# Patient Record
Sex: FEMALE | Race: WHITE | Employment: UNEMPLOYED | URBAN - METROPOLITAN AREA
[De-identification: names, ages, dates, MRNs, and addresses within clinical notes are randomized per-mention and may not be internally consistent; named-entity substitution may affect disease eponyms.]

---

## 2020-12-23 ENCOUNTER — APPOINTMENT (OUTPATIENT)
Dept: CT IMAGING | Age: 55
DRG: 177 | End: 2020-12-23
Payer: COMMERCIAL

## 2020-12-23 ENCOUNTER — HOSPITAL ENCOUNTER (INPATIENT)
Age: 55
LOS: 4 days | Discharge: HOME HEALTH | DRG: 177 | End: 2020-12-27
Attending: EMERGENCY MEDICINE | Admitting: INTERNAL MEDICINE
Payer: COMMERCIAL

## 2020-12-23 ENCOUNTER — APPOINTMENT (OUTPATIENT)
Dept: GENERAL RADIOLOGY | Age: 55
DRG: 177 | End: 2020-12-23
Payer: COMMERCIAL

## 2020-12-23 PROBLEM — E66.01 MORBID OBESITY WITH BMI OF 40.0-44.9, ADULT (HCC): Status: ACTIVE | Noted: 2020-12-23

## 2020-12-23 PROBLEM — R79.82 CRP ELEVATED: Status: ACTIVE | Noted: 2020-12-23

## 2020-12-23 PROBLEM — J12.82 PNEUMONIA DUE TO COVID-19 VIRUS: Status: ACTIVE | Noted: 2020-12-23

## 2020-12-23 PROBLEM — R09.02 HYPOXIA: Status: ACTIVE | Noted: 2020-12-23

## 2020-12-23 PROBLEM — U07.1 COVID-19 VIRUS INFECTION: Status: ACTIVE | Noted: 2020-12-23

## 2020-12-23 LAB
-: NORMAL
ABSOLUTE EOS #: <0.03 K/UL (ref 0–0.44)
ABSOLUTE IMMATURE GRANULOCYTE: 0.03 K/UL (ref 0–0.3)
ABSOLUTE LYMPH #: 1.53 K/UL (ref 1.1–3.7)
ABSOLUTE MONO #: 0.42 K/UL (ref 0.1–1.2)
ALBUMIN SERPL-MCNC: 3.9 G/DL (ref 3.5–5.2)
ALBUMIN/GLOBULIN RATIO: 1.1 (ref 1–2.5)
ALP BLD-CCNC: 64 U/L (ref 35–104)
ALT SERPL-CCNC: 43 U/L (ref 5–33)
AMORPHOUS: NORMAL
ANION GAP SERPL CALCULATED.3IONS-SCNC: 15 MMOL/L (ref 9–17)
AST SERPL-CCNC: 57 U/L
BACTERIA: NORMAL
BASOPHILS # BLD: 0 % (ref 0–2)
BASOPHILS ABSOLUTE: <0.03 K/UL (ref 0–0.2)
BILIRUB SERPL-MCNC: 0.38 MG/DL (ref 0.3–1.2)
BILIRUBIN URINE: NEGATIVE
BNP INTERPRETATION: NORMAL
BUN BLDV-MCNC: 12 MG/DL (ref 6–20)
BUN/CREAT BLD: ABNORMAL (ref 9–20)
C-REACTIVE PROTEIN: 33.4 MG/L (ref 0–5)
CALCIUM SERPL-MCNC: 8.6 MG/DL (ref 8.6–10.4)
CASTS UA: NORMAL /LPF (ref 0–8)
CHLORIDE BLD-SCNC: 102 MMOL/L (ref 98–107)
CO2: 19 MMOL/L (ref 20–31)
COLOR: YELLOW
COMMENT UA: ABNORMAL
CREAT SERPL-MCNC: 0.8 MG/DL (ref 0.5–0.9)
CRYSTALS, UA: NORMAL /HPF
D-DIMER QUANTITATIVE: 0.57 MG/L FEU
DIFFERENTIAL TYPE: ABNORMAL
EKG ATRIAL RATE: 106 BPM
EKG P AXIS: 34 DEGREES
EKG P-R INTERVAL: 136 MS
EKG Q-T INTERVAL: 326 MS
EKG QRS DURATION: 98 MS
EKG QTC CALCULATION (BAZETT): 433 MS
EKG R AXIS: -34 DEGREES
EKG T AXIS: 43 DEGREES
EKG VENTRICULAR RATE: 106 BPM
EOSINOPHILS RELATIVE PERCENT: 0 % (ref 1–4)
EPITHELIAL CELLS UA: NORMAL /HPF (ref 0–5)
ESTIMATED AVERAGE GLUCOSE: 103 MG/DL
FERRITIN: 248 UG/L (ref 13–150)
FIBRINOGEN: 589 MG/DL (ref 140–420)
GFR AFRICAN AMERICAN: >60 ML/MIN
GFR NON-AFRICAN AMERICAN: >60 ML/MIN
GFR SERPL CREATININE-BSD FRML MDRD: ABNORMAL ML/MIN/{1.73_M2}
GFR SERPL CREATININE-BSD FRML MDRD: ABNORMAL ML/MIN/{1.73_M2}
GLUCOSE BLD-MCNC: 105 MG/DL (ref 70–99)
GLUCOSE URINE: NEGATIVE
HBA1C MFR BLD: 5.2 % (ref 4–6)
HCT VFR BLD CALC: 42.7 % (ref 36.3–47.1)
HEMOGLOBIN: 13.8 G/DL (ref 11.9–15.1)
IMMATURE GRANULOCYTES: 1 %
INR BLD: 0.9
KETONES, URINE: ABNORMAL
LACTATE DEHYDROGENASE: 439 U/L (ref 135–214)
LACTIC ACID, SEPSIS WHOLE BLOOD: 1.1 MMOL/L (ref 0.5–1.9)
LACTIC ACID, SEPSIS WHOLE BLOOD: 1.7 MMOL/L (ref 0.5–1.9)
LACTIC ACID, SEPSIS: NORMAL MMOL/L (ref 0.5–1.9)
LACTIC ACID, SEPSIS: NORMAL MMOL/L (ref 0.5–1.9)
LEUKOCYTE ESTERASE, URINE: ABNORMAL
LYMPHOCYTES # BLD: 26 % (ref 24–43)
MCH RBC QN AUTO: 29 PG (ref 25.2–33.5)
MCHC RBC AUTO-ENTMCNC: 32.3 G/DL (ref 28.4–34.8)
MCV RBC AUTO: 89.7 FL (ref 82.6–102.9)
MONOCYTES # BLD: 7 % (ref 3–12)
MUCUS: NORMAL
NITRITE, URINE: NEGATIVE
NRBC AUTOMATED: 0 PER 100 WBC
OTHER OBSERVATIONS UA: NORMAL
PARTIAL THROMBOPLASTIN TIME: 21.7 SEC (ref 20.5–30.5)
PDW BLD-RTO: 13.4 % (ref 11.8–14.4)
PH UA: 5.5 (ref 5–8)
PLATELET # BLD: 188 K/UL (ref 138–453)
PLATELET ESTIMATE: ABNORMAL
PMV BLD AUTO: 10.8 FL (ref 8.1–13.5)
POTASSIUM SERPL-SCNC: 4.4 MMOL/L (ref 3.7–5.3)
PRO-BNP: 24 PG/ML
PROCALCITONIN: 0.12 NG/ML
PROTEIN UA: NEGATIVE
PROTHROMBIN TIME: 9.4 SEC (ref 9–12)
RBC # BLD: 4.76 M/UL (ref 3.95–5.11)
RBC # BLD: ABNORMAL 10*6/UL
RBC UA: NORMAL /HPF (ref 0–4)
RENAL EPITHELIAL, UA: NORMAL /HPF
SARS-COV-2, RAPID: DETECTED
SARS-COV-2: ABNORMAL
SARS-COV-2: ABNORMAL
SEDIMENTATION RATE, ERYTHROCYTE: 55 MM (ref 0–30)
SEG NEUTROPHILS: 66 % (ref 36–65)
SEGMENTED NEUTROPHILS ABSOLUTE COUNT: 3.8 K/UL (ref 1.5–8.1)
SODIUM BLD-SCNC: 136 MMOL/L (ref 135–144)
SOURCE: ABNORMAL
SPECIFIC GRAVITY UA: 1.01 (ref 1–1.03)
TOTAL PROTEIN: 7.3 G/DL (ref 6.4–8.3)
TRICHOMONAS: NORMAL
TROPONIN INTERP: NORMAL
TROPONIN INTERP: NORMAL
TROPONIN T: NORMAL NG/ML
TROPONIN T: NORMAL NG/ML
TROPONIN, HIGH SENSITIVITY: 7 NG/L (ref 0–14)
TROPONIN, HIGH SENSITIVITY: <6 NG/L (ref 0–14)
TURBIDITY: CLEAR
URINE HGB: NEGATIVE
UROBILINOGEN, URINE: NORMAL
WBC # BLD: 5.8 K/UL (ref 3.5–11.3)
WBC # BLD: ABNORMAL 10*3/UL
WBC UA: NORMAL /HPF (ref 0–5)
YEAST: NORMAL

## 2020-12-23 PROCEDURE — 6360000004 HC RX CONTRAST MEDICATION: Performed by: STUDENT IN AN ORGANIZED HEALTH CARE EDUCATION/TRAINING PROGRAM

## 2020-12-23 PROCEDURE — 85610 PROTHROMBIN TIME: CPT

## 2020-12-23 PROCEDURE — 84484 ASSAY OF TROPONIN QUANT: CPT

## 2020-12-23 PROCEDURE — 83605 ASSAY OF LACTIC ACID: CPT

## 2020-12-23 PROCEDURE — 93005 ELECTROCARDIOGRAM TRACING: CPT | Performed by: STUDENT IN AN ORGANIZED HEALTH CARE EDUCATION/TRAINING PROGRAM

## 2020-12-23 PROCEDURE — 84145 PROCALCITONIN (PCT): CPT

## 2020-12-23 PROCEDURE — 83615 LACTATE (LD) (LDH) ENZYME: CPT

## 2020-12-23 PROCEDURE — 93010 ELECTROCARDIOGRAM REPORT: CPT | Performed by: INTERNAL MEDICINE

## 2020-12-23 PROCEDURE — 83880 ASSAY OF NATRIURETIC PEPTIDE: CPT

## 2020-12-23 PROCEDURE — 97161 PT EVAL LOW COMPLEX 20 MIN: CPT

## 2020-12-23 PROCEDURE — 86140 C-REACTIVE PROTEIN: CPT

## 2020-12-23 PROCEDURE — 94761 N-INVAS EAR/PLS OXIMETRY MLT: CPT

## 2020-12-23 PROCEDURE — 97165 OT EVAL LOW COMPLEX 30 MIN: CPT

## 2020-12-23 PROCEDURE — 71045 X-RAY EXAM CHEST 1 VIEW: CPT

## 2020-12-23 PROCEDURE — 6360000002 HC RX W HCPCS: Performed by: NURSE PRACTITIONER

## 2020-12-23 PROCEDURE — 2500000003 HC RX 250 WO HCPCS: Performed by: FAMILY MEDICINE

## 2020-12-23 PROCEDURE — 6370000000 HC RX 637 (ALT 250 FOR IP): Performed by: STUDENT IN AN ORGANIZED HEALTH CARE EDUCATION/TRAINING PROGRAM

## 2020-12-23 PROCEDURE — 82728 ASSAY OF FERRITIN: CPT

## 2020-12-23 PROCEDURE — 6370000000 HC RX 637 (ALT 250 FOR IP): Performed by: NURSE PRACTITIONER

## 2020-12-23 PROCEDURE — 2700000000 HC OXYGEN THERAPY PER DAY

## 2020-12-23 PROCEDURE — U0003 INFECTIOUS AGENT DETECTION BY NUCLEIC ACID (DNA OR RNA); SEVERE ACUTE RESPIRATORY SYNDROME CORONAVIRUS 2 (SARS-COV-2) (CORONAVIRUS DISEASE [COVID-19]), AMPLIFIED PROBE TECHNIQUE, MAKING USE OF HIGH THROUGHPUT TECHNOLOGIES AS DESCRIBED BY CMS-2020-01-R: HCPCS

## 2020-12-23 PROCEDURE — 2580000003 HC RX 258: Performed by: FAMILY MEDICINE

## 2020-12-23 PROCEDURE — 1200000000 HC SEMI PRIVATE

## 2020-12-23 PROCEDURE — 80053 COMPREHEN METABOLIC PANEL: CPT

## 2020-12-23 PROCEDURE — 97530 THERAPEUTIC ACTIVITIES: CPT

## 2020-12-23 PROCEDURE — U0002 COVID-19 LAB TEST NON-CDC: HCPCS

## 2020-12-23 PROCEDURE — 71260 CT THORAX DX C+: CPT

## 2020-12-23 PROCEDURE — 99222 1ST HOSP IP/OBS MODERATE 55: CPT | Performed by: FAMILY MEDICINE

## 2020-12-23 PROCEDURE — 2580000003 HC RX 258: Performed by: STUDENT IN AN ORGANIZED HEALTH CARE EDUCATION/TRAINING PROGRAM

## 2020-12-23 PROCEDURE — 2580000003 HC RX 258: Performed by: NURSE PRACTITIONER

## 2020-12-23 PROCEDURE — 85652 RBC SED RATE AUTOMATED: CPT

## 2020-12-23 PROCEDURE — 81001 URINALYSIS AUTO W/SCOPE: CPT

## 2020-12-23 PROCEDURE — 97535 SELF CARE MNGMENT TRAINING: CPT

## 2020-12-23 PROCEDURE — 99285 EMERGENCY DEPT VISIT HI MDM: CPT

## 2020-12-23 PROCEDURE — 85025 COMPLETE CBC W/AUTO DIFF WBC: CPT

## 2020-12-23 PROCEDURE — 85384 FIBRINOGEN ACTIVITY: CPT

## 2020-12-23 PROCEDURE — 6370000000 HC RX 637 (ALT 250 FOR IP): Performed by: FAMILY MEDICINE

## 2020-12-23 PROCEDURE — 85730 THROMBOPLASTIN TIME PARTIAL: CPT

## 2020-12-23 PROCEDURE — 85379 FIBRIN DEGRADATION QUANT: CPT

## 2020-12-23 PROCEDURE — 83036 HEMOGLOBIN GLYCOSYLATED A1C: CPT

## 2020-12-23 RX ORDER — NICOTINE 21 MG/24HR
1 PATCH, TRANSDERMAL 24 HOURS TRANSDERMAL DAILY PRN
Status: DISCONTINUED | OUTPATIENT
Start: 2020-12-23 | End: 2020-12-27 | Stop reason: HOSPADM

## 2020-12-23 RX ORDER — 0.9 % SODIUM CHLORIDE 0.9 %
500 INTRAVENOUS SOLUTION INTRAVENOUS ONCE
Status: COMPLETED | OUTPATIENT
Start: 2020-12-23 | End: 2020-12-23

## 2020-12-23 RX ORDER — ONDANSETRON 2 MG/ML
4 INJECTION INTRAMUSCULAR; INTRAVENOUS EVERY 6 HOURS PRN
Status: DISCONTINUED | OUTPATIENT
Start: 2020-12-23 | End: 2020-12-27 | Stop reason: HOSPADM

## 2020-12-23 RX ORDER — ACETAMINOPHEN 650 MG/1
650 SUPPOSITORY RECTAL EVERY 6 HOURS PRN
Status: DISCONTINUED | OUTPATIENT
Start: 2020-12-23 | End: 2020-12-27 | Stop reason: HOSPADM

## 2020-12-23 RX ORDER — ACETAMINOPHEN 325 MG/1
650 TABLET ORAL EVERY 6 HOURS PRN
Status: DISCONTINUED | OUTPATIENT
Start: 2020-12-23 | End: 2020-12-23

## 2020-12-23 RX ORDER — GUAIFENESIN DEXTROMETHORPHAN HYDROBROMIDE ORAL SOLUTION 10; 100 MG/5ML; MG/5ML
5 SOLUTION ORAL EVERY 4 HOURS PRN
Status: DISCONTINUED | OUTPATIENT
Start: 2020-12-23 | End: 2020-12-27 | Stop reason: HOSPADM

## 2020-12-23 RX ORDER — PROMETHAZINE HYDROCHLORIDE 12.5 MG/1
12.5 TABLET ORAL EVERY 6 HOURS PRN
Status: DISCONTINUED | OUTPATIENT
Start: 2020-12-23 | End: 2020-12-27 | Stop reason: HOSPADM

## 2020-12-23 RX ORDER — 0.9 % SODIUM CHLORIDE 0.9 %
30 INTRAVENOUS SOLUTION INTRAVENOUS PRN
Status: DISCONTINUED | OUTPATIENT
Start: 2020-12-23 | End: 2020-12-27 | Stop reason: HOSPADM

## 2020-12-23 RX ORDER — SODIUM CHLORIDE 0.9 % (FLUSH) 0.9 %
10 SYRINGE (ML) INJECTION PRN
Status: DISCONTINUED | OUTPATIENT
Start: 2020-12-23 | End: 2020-12-27 | Stop reason: HOSPADM

## 2020-12-23 RX ORDER — VITAMIN B COMPLEX
6000 TABLET ORAL DAILY
Status: DISCONTINUED | OUTPATIENT
Start: 2020-12-23 | End: 2020-12-27 | Stop reason: HOSPADM

## 2020-12-23 RX ORDER — ASCORBIC ACID 250 MG
250 TABLET ORAL DAILY
Status: ON HOLD | COMMUNITY
End: 2020-12-27 | Stop reason: HOSPADM

## 2020-12-23 RX ORDER — ACETAMINOPHEN 650 MG/1
650 SUPPOSITORY RECTAL EVERY 6 HOURS PRN
Status: DISCONTINUED | OUTPATIENT
Start: 2020-12-23 | End: 2020-12-23

## 2020-12-23 RX ORDER — SODIUM CHLORIDE 0.9 % (FLUSH) 0.9 %
10 SYRINGE (ML) INJECTION EVERY 12 HOURS SCHEDULED
Status: DISCONTINUED | OUTPATIENT
Start: 2020-12-23 | End: 2020-12-27 | Stop reason: HOSPADM

## 2020-12-23 RX ORDER — ASCORBIC ACID 500 MG
500 TABLET ORAL 2 TIMES DAILY
Status: DISCONTINUED | OUTPATIENT
Start: 2020-12-23 | End: 2020-12-27 | Stop reason: HOSPADM

## 2020-12-23 RX ORDER — ZINC SULFATE 50(220)MG
50 CAPSULE ORAL 2 TIMES DAILY
Status: DISCONTINUED | OUTPATIENT
Start: 2020-12-23 | End: 2020-12-27 | Stop reason: HOSPADM

## 2020-12-23 RX ORDER — ACETAMINOPHEN 325 MG/1
650 TABLET ORAL EVERY 6 HOURS PRN
Status: DISCONTINUED | OUTPATIENT
Start: 2020-12-23 | End: 2020-12-27 | Stop reason: HOSPADM

## 2020-12-23 RX ORDER — FERROUS SULFATE 325(65) MG
325 TABLET ORAL DAILY
COMMUNITY

## 2020-12-23 RX ADMIN — CEFTRIAXONE SODIUM 1 G: 1 INJECTION, POWDER, FOR SOLUTION INTRAMUSCULAR; INTRAVENOUS at 09:00

## 2020-12-23 RX ADMIN — ENOXAPARIN SODIUM 30 MG: 30 INJECTION SUBCUTANEOUS at 09:01

## 2020-12-23 RX ADMIN — OXYCODONE HYDROCHLORIDE AND ACETAMINOPHEN 500 MG: 500 TABLET ORAL at 20:31

## 2020-12-23 RX ADMIN — SODIUM CHLORIDE 500 ML: 0.9 INJECTION, SOLUTION INTRAVENOUS at 03:36

## 2020-12-23 RX ADMIN — DEXAMETHASONE 6 MG: 4 TABLET ORAL at 09:00

## 2020-12-23 RX ADMIN — Medication 5 ML: at 20:34

## 2020-12-23 RX ADMIN — ENOXAPARIN SODIUM 40 MG: 40 INJECTION SUBCUTANEOUS at 20:31

## 2020-12-23 RX ADMIN — Medication 5 ML: at 03:37

## 2020-12-23 RX ADMIN — IOPAMIDOL 75 ML: 755 INJECTION, SOLUTION INTRAVENOUS at 03:12

## 2020-12-23 RX ADMIN — Medication 6000 UNITS: at 09:00

## 2020-12-23 RX ADMIN — REMDESIVIR 200 MG: 100 INJECTION, POWDER, LYOPHILIZED, FOR SOLUTION INTRAVENOUS at 12:24

## 2020-12-23 RX ADMIN — Medication 500 MG: at 08:59

## 2020-12-23 RX ADMIN — ZINC SULFATE 220 MG (50 MG) CAPSULE 50 MG: CAPSULE at 20:31

## 2020-12-23 RX ADMIN — SODIUM CHLORIDE 500 ML: 0.9 INJECTION, SOLUTION INTRAVENOUS at 01:15

## 2020-12-23 ASSESSMENT — PAIN DESCRIPTION - LOCATION
LOCATION: WRIST

## 2020-12-23 ASSESSMENT — PAIN DESCRIPTION - PAIN TYPE
TYPE: ACUTE PAIN

## 2020-12-23 ASSESSMENT — PAIN DESCRIPTION - ORIENTATION
ORIENTATION: LEFT

## 2020-12-23 ASSESSMENT — PAIN SCALES - GENERAL
PAINLEVEL_OUTOF10: 4
PAINLEVEL_OUTOF10: 0

## 2020-12-23 ASSESSMENT — ENCOUNTER SYMPTOMS
EYE REDNESS: 0
EYE ITCHING: 0
SORE THROAT: 1
RHINORRHEA: 1
VOMITING: 0
SHORTNESS OF BREATH: 1
NAUSEA: 0
ABDOMINAL PAIN: 0
COUGH: 1

## 2020-12-23 NOTE — ED TRIAGE NOTES
Pt to ED for cough/ SOB that she states is worsening today. Pt states that she tested positive 12/17 for Covid-19 and symptoms began 12/14. Pt states that today she has been more weak, and has had several coughing spells. Pt states that she's been monitoring her SpO2 at home with lowest reading 89% on RA. Pt states that she has had intermittent fevers. Pt arrives A&Ox4 with dyspnea from walking back to room. EKG obtained, IV access obtained, pt resting on stretcher, call light in reach. No further concerns at this time.

## 2020-12-23 NOTE — PROGRESS NOTES
Occupational Therapy   Occupational Therapy Initial Assessment  Date: 2020   Patient Name: Holli Pittman  MRN: [id number]     : 1965    Date of Service: 2020    Discharge Recommendations:  Home with assist PRN  OT Equipment Recommendations  Equipment Needed: No    Assessment   Assessment: Pt completed functional mobility and ADLs independently with supervision for safety. Pt does not currently require skilled OT services during her acute hospitilization stay. Pt educated to report to RN/MD if functional changes occur. Prognosis: Good  Decision Making: Low Complexity  OT Education: OT Role;Plan of Care;Energy Conservation  REQUIRES OT FOLLOW UP: No  Activity Tolerance  Activity Tolerance: Patient Tolerated treatment well  Safety Devices  Safety Devices in place: Yes  Type of devices: Gait belt;Nurse notified;Call light within reach; Left in chair  Restraints  Initially in place: No           Patient Diagnosis(es): The primary encounter diagnosis was COVID-19. A diagnosis of Hypoxia was also pertinent to this visit. has no past medical history on file. has no past surgical history on file. Restrictions  Restrictions/Precautions  Restrictions/Precautions: General Precautions, Isolation, Up as Tolerated(Droplet plus)  Required Braces or Orthoses?: No  Position Activity Restriction  Other position/activity restrictions: no PE    Subjective   General  Patient assessed for rehabilitation services?: Yes  Family / Caregiver Present: No  General Comment  Comments: RN ok'd for therapy visit this date. Pt agreeable to session, pleasent/cooperative throughout. Patient Currently in Pain: Yes  Pain Assessment  Pain Assessment: 0-10  Pain Level: 4  Pain Type: Acute pain  Pain Location: Wrist  Pain Orientation: Left  Non-Pharmaceutical Pain Intervention(s): Ambulation/Increased Activity; Emotional support;Distraction  Response to Pain Intervention: Patient Satisfied  Vital Signs  Pulse: 80 Resp: 21  Patient Currently in Pain: Yes  Oxygen Therapy  SpO2: 92 %     Social/Functional History  Social/Functional History  Lives With: Spouse  Type of Home: House  Home Layout: One level  Home Access: Stairs to enter without rails  Entrance Stairs - Number of Steps: 4, wide steps  Bathroom Shower/Tub: Walk-in shower  Bathroom Toilet: Standard  Bathroom Equipment: (NO equiptment)  Home Equipment: (No DME)  ADL Assistance: Independent  Homemaking Assistance: Independent  Homemaking Responsibilities: Yes  Ambulation Assistance: Independent  Transfer Assistance: Independent  Active : Yes  Mode of Transportation: SUV  Type of occupation: 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 [facility]  Leisure & Hobbies: knit, alda, draw, woodworking  Additional Comments: Lives in [place] but was taking care of mother       Objective   Vision: Impaired  Vision Exceptions: Wears glasses at all times  Hearing: Within functional limits    Orientation  Overall Orientation Status: Within Functional Limits     Balance  Sitting Balance: Independent(EOB to don socks)  Standing Balance: Supervision  Standing Balance  Time: ~7 minutes  Activity: functional mobility around room, grooming task sinkside, UB dressing  Functional Mobility  Functional - Mobility Device: No device  Activity: To/from bathroom  Assist Level: Supervision  Functional Mobility Comments: Pt steady, no LOB or complaints of fatigue. Pt reports this is her baseline gait.   ADL  Feeding: Independent  Grooming: Supervision(Sinkside for oral hygeine)  UE Bathing: Independent  LE Bathing: Independent  UE Dressing: Independent  LE Dressing: Independent  Toileting: Independent  Tone RUE  RUE Tone: Normotonic  Tone LUE  LUE Tone: Normotonic  Coordination  Movements Are Fluid And Coordinated: Yes     Bed mobility  Supine to Sit: Stand by assistance  Sit to Supine: (left in chair)  Scooting: Stand by assistance  Transfers  Sit to stand: Supervision Stand to sit: Supervision     Cognition  Overall Cognitive Status: WFL        Sensation  Overall Sensation Status: WFL(Denies numbness or tingling)        LUE AROM (degrees)  LUE AROM : WFL  Left Hand AROM (degrees)  Left Hand AROM: WFL  RUE AROM (degrees)  RUE AROM : WFL  Right Hand AROM (degrees)  Right Hand AROM: WFL  LUE Strength  Gross LUE Strength: WFL  L Hand General: 5/5  LUE Strength Comment: Grossly 5/5  RUE Strength  Gross RUE Strength: WFL  R Hand General: 5/5  RUE Strength Comment: Grossly 5/5                        AM-PAC Score        AM-[facility] Inpatient Daily Activity Raw Score: 24 (12/23/20 1623)  AM-PAC Inpatient ADL T-Scale Score : 57.54 (12/23/20 1623)  ADL Inpatient CMS 0-100% Score: 0 (12/23/20 1623)  ADL Inpatient CMS G-Code Modifier : CH (12/23/20 1623)    Goals          Therapy Time   Individual Concurrent Group Co-treatment   Time In 1039         Time Out 1059         Minutes 20         Timed Code Treatment Minutes: 8 Minutes       HOUSTON Gudino/L

## 2020-12-23 NOTE — PROGRESS NOTES
Physical Therapy    Facility/Department: [facility]  Initial Assessment    NAME: Colton Baker  : 1965  MRN: [id number]    Chief Complaint   Patient presents with    Shortness of Breath     tested positive for COVID 9 days ago       Date of Service: 2020    Discharge Recommendations:    No further therapy required at discharge. PT Equipment Recommendations  Equipment Needed: No    Assessment   Assessment: Pt grossly SBA to CGA initially improving to I with all mobility, amb 40' no AD I. Pt without acute PT needs, physical therapy to sign off. Prognosis: Good  Decision Making: Medium Complexity  PT Education: Plan of Care;PT Role;General Safety  REQUIRES PT FOLLOW UP: No  Activity Tolerance  Activity Tolerance: Patient Tolerated treatment well       Patient Diagnosis(es): The primary encounter diagnosis was COVID-19. A diagnosis of Hypoxia was also pertinent to this visit. has no past medical history on file. has no past surgical history on file. Restrictions  Restrictions/Precautions  Restrictions/Precautions: General Precautions, Isolation, Up as Tolerated(Droplet Plus)  Required Braces or Orthoses?: No  Position Activity Restriction  Other position/activity restrictions: no PE  Vision/Hearing  Vision: Impaired  Vision Exceptions: Wears glasses at all times(or contacts)  Hearing: Within functional limits     Subjective  General  Chart Reviewed: Yes  Patient assessed for rehabilitation services?: Yes  Response To Previous Treatment: Not applicable  Family / Caregiver Present: No  Follows Commands: Within Functional Limits  General Comment  Comments: OT co-eval  Subjective  Subjective: RN and pt agreeable to PT.  Pt alert in bed upon arrival.  Pain Screening  Patient Currently in Pain: Yes  Pain Assessment  Pain Assessment: 0-10  Pain Level: 4  Pain Type: Acute pain  Pain Location: Wrist  Pain Orientation: Left Sitting - Dynamic: Good  Standing - Static: Good  Standing - Dynamic: Good  Comments: no AD used  Exercises  Comments: toileted, supervision for safety, no assistance needed     Plan   Plan  Times per week: d/c PT  Safety Devices  Type of devices: Call light within reach, Gait belt, Left in chair, All fall risk precautions in place, Nurse notified  Restraints  Initially in place: No    AM-PAC Score  AM-PAC Inpatient Mobility Raw Score : 24 (12/23/20 1451)  AM-PAC Inpatient T-Scale Score : 61.14 (12/23/20 1451)  Mobility Inpatient CMS 0-100% Score: 0 (12/23/20 1451)  Mobility Inpatient CMS G-Code Modifier : [address] (12/23/20 1451)          Goals  Short term goals  Time Frame for Short term goals: d/c PT       Therapy Time   Individual Concurrent Group Co-treatment   Time In 1039         Time Out 1059         Minutes 20         Timed Code Treatment Minutes: 8 Minutes     Physical therapy to sign off.   Jonathan Nieves, PT

## 2020-12-23 NOTE — CARE COORDINATION
Case Management Initial Discharge Plan  Chioorralexei Pena,             Met with:patient via telephone to discuss discharge plans. Information verified: address, contacts, phone number, , insurance Yes    Emergency Contact/Next of Kin name & number: Bob Oliveira () [phone]    PCP: Golden Hoff MD  Date of last visit: couple months ago    Insurance Provider: [employer]    Discharge Planning    Living Arrangements:  Spouse/Significant Other   Support Systems:  Spouse/Significant Other, Family Members    Home has 1 stories   stairs to climb to get into front door, stairs to climb to reach second floor  Location of bedroom/bathroom in home     Patient able to perform ADL's:Independent    Current Services (outpatient & in home)   DME equipment:   DME provider:     Receiving oral anticoagulation therapy? No    If indicated:   Physician managing anticoagulation treatment:   Where does patient obtain lab work for ATC treatment? Potential Assistance Needed:  Durable Medical Equipment    Patient agreeable to home care: No  [place] of choice provided:  n/a    Prior SNF/Rehab Placement and Facility:   Agreeable to SNF/Rehab: No  [place] of choice provided: n/a     Evaluation: no    Expected Discharge date:       Patient expects to be discharged to:  home independently with   Follow Up Appointment: Best Day/ Time:      Transportation provider:   Transportation arrangements needed for discharge: No    Readmission Risk              Risk of Unplanned Readmission:        9             Does patient have a readmission risk score greater than 14?: No  If yes, follow-up appointment must be made within 7 days of discharge. Goals of Care: increased comfort      Discharge Plan: return home independently with .  Monitor for home O2 needs          Electronically signed by Pj Maria RN on 20 at 9:29 AM EST

## 2020-12-23 NOTE — ED PROVIDER NOTES
[facility]     Emergency Department     Faculty Attestation    I performed a history and physical examination of the patient and discussed management with the resident. I have reviewed and agree with the residents findings including all diagnostic interpretations, and treatment plans as written. Any areas of disagreement are noted on the chart. I was personally present for the key portions of any procedures. I have documented in the chart those procedures where I was not present during the key portions. I have reviewed the emergency nurses triage note. I agree with the chief complaint, past medical history, past surgical history, allergies, medications, social and family history as documented unless otherwise noted below. Documentation of the HPI, Physical Exam and Medical Decision Making performed by [email or web address] is based on my personal performance of the HPI, PE and MDM. For Physician Assistant/ Nurse Practitioner cases/documentation I have personally evaluated this patient and have completed at least one if not all key elements of the E/M (history, physical exam, and MDM). Additional findings are as noted. 49 yo F c/o sob, cough, covid + 9 d prior, no vomit , no injury,   pe 100.2, sat 89% on ra, neck supple, abdomen non tender, no distension, no rigidity, no calf tenderness, no calf swelling,     covid +, ct no pe, admitted,     EKG Interpretation    Interpreted by me  Sinus tachycardia, hr 106, no ischemia, L axis, qtc 433    CRITICAL CARE: There was a high probability of clinically significant/life threatening deterioration in this patient's condition which required my urgent intervention. Total critical care time was 5 minutes. This excludes any time for separately reportable procedures.        Neeraj-Woody 24, DO  12/23/20 [address], DO  12/23/20 0788

## 2020-12-23 NOTE — ED NOTES
The following labs were labeled with patient stickers & tubed to lab;    [x]Lavender   []On Ice  [x]Blue  [x]Green/ Yellow  [x]Green/ Black [x]On Ice  []Pink  []Red  []Yellow    []COVID-19 Swab []Rapid    []Urine Sample  []Pelvic Cultures    []Blood Cultures     Meryle Foley, RN  12/23/20 0111

## 2020-12-23 NOTE — H&P
[facility]  Office: [address], DO, Bernice Meadowsbill, DO, Jarrett Villanueva, DO, Gil Pennington, DO, Dileep Levin MD, Juan Sosa MD, Kathy Sims MD, Manuel Reyes MD, Vandana Jones MD, Gayle Powell MD, Tamar Mendez MD, Edel Gambino MD, Angel Tanner MD, Noah Bravo, DO, Pamela Miller MD, Ai Moy MD, Mervin Morelos, DO, Khang Nova MD,  Dm Alcazar DO, Santa Kay MD, Marcos Ogden MD, Prem Monahan, [facility], [facility], CNP, Karla Gaytan, CNP, Desean Wood, CNS, Tequila Andre, CNP, Chaitanya Enciso, CNP, Dali Solis, CNP, Bhupendra Arroyo, CNP, Fernando Goldman, CNP, Gallito Velez PA-C, Baldemar Buchanan, [facility], Micah Cardoso, CNP, Corry Fuentes, CNP, Anne-Marie Bell, CNP, Lou Tran, CNP, Haider Hernandez, CNP         [facility]   [address] [facility]    HISTORY AND PHYSICAL EXAMINATION            Date:   12/23/2020  Patient name:  Lowell Schwartz  Date of admission:  12/23/2020 12:43 AM  MRN:   [id number]  Account:  [de-identified]  YOB: 1965  PCP:    No primary care provider on file. Room:   2024/2024-01  Code Status:    Full Code    Chief Complaint:     Chief Complaint   Patient presents with    Shortness of Breath     tested positive for COVID 9 days ago       History Obtained From:     patient, electronic medical record    History of Present Illness:     Lowell Schwartz is a 50 y.o. Non-/non  female who presents with Shortness of Breath (tested positive for COVID 9 days ago)   and is admitted to the hospital for the management of COVID-19 positive test (U07.1, COVID-19) with Acute Pneumonia and hypoxia   Patient with no known medical history except for morbid obesity, lives in [place] but here since August to assist her sick mother and help her daughter was babysitting presented to our ER with progressively worsening shortness of breath. ENDOCRINE:  negative increase in drinking, increase in urination, hot or cold intolerance  MUSCULOSKELETAL:  negative joint pains, muscle aches, swelling of joints  NEUROLOGICAL:  negative for headaches, dizziness, lightheadedness, numbness, pain, tingling extremities  BEHAVIOR/PSYCH:  negative for depression, anxiety    Physical Exam:   /78   Pulse 80   Temp 98.6 °F (37 °C) (Oral)   Resp 21   Ht 5' 5\" (1.651 m)   Wt 254 lb (115.2 kg)   SpO2 92%   BMI 42.27 kg/m²   Temp (24hrs), Av.5 °F (37.5 °C), Min:98.6 °F (37 °C), Max:100.2 °F (37.9 °C)    No results for input(s): POCGLU in the last 72 hours.   No intake or output data in the 24 hours ending 20 1624    General Appearance: alert, well appearing, and in no acute distress  Mental status: oriented to person, place, and time  Head: normocephalic, atraumatic  Eye: no icterus, redness, pupils equal and reactive, extraocular eye movements intact, conjunctiva clear  Ear: normal external ear, no discharge, hearing intact  Nose: Nasal cannula, no drainage noted  Mouth: mucous membranes moist  Neck: supple, no carotid bruits, thyroid not palpable  Lungs: Decreased air entry,  no wheezing, rales or rhonchi, normal effort  Cardiovascular: normal rate, regular rhythm, no murmur, gallop, rub  Abdomen: Soft, nontender, nondistended, normal bowel sounds, no hepatomegaly or splenomegaly  Neurologic: There are no new focal motor or sensory deficits, normal muscle tone and bulk, no abnormal sensation, normal speech, cranial nerves II through XII grossly intact  Skin: No gross lesions, rashes, bruising or bleeding on exposed skin area  Extremities: peripheral pulses palpable, no pedal edema or calf pain with palpation  Psych: normal affect    Investigations:      Laboratory Testing:  Recent Results (from the past 24 hour(s))   EKG 12 Lead    Collection Time: 20 12:54 AM   Result Value Ref Range    Ventricular Rate 106 BPM    Atrial Rate 106 BPM P-R Interval 136 ms    QRS Duration 98 ms    Q-T Interval 326 ms    QTc Calculation (Bazett) 433 ms    P Axis 34 degrees    R Axis -34 degrees    T Axis 43 degrees   CBC WITH AUTO DIFFERENTIAL    Collection Time: 12/23/20  1:23 AM   Result Value Ref Range    WBC 5.8 3.5 - 11.3 k/uL    RBC 4.76 3.95 - 5.11 m/uL    Hemoglobin 13.8 11.9 - 15.1 g/dL    Hematocrit 42.7 36.3 - 47.1 %    MCV 89.7 82.6 - 102.9 fL    MCH 29.0 25.2 - 33.5 pg    MCHC 32.3 28.4 - 34.8 g/dL    RDW 13.4 11.8 - 14.4 %    Platelets 939 071 - 169 k/uL    MPV 10.8 8.1 - 13.5 fL    NRBC Automated 0.0 0.0 per 100 WBC    Differential Type NOT REPORTED     Seg Neutrophils 66 (H) 36 - 65 %    Lymphocytes 26 24 - 43 %    Monocytes 7 3 - 12 %    Eosinophils % 0 (L) 1 - 4 %    Basophils 0 0 - 2 %    Immature Granulocytes 1 (H) 0 %    Segs Absolute 3.80 1.50 - 8.10 k/uL    Absolute Lymph # 1.53 1.10 - 3.70 k/uL    Absolute Mono # 0.42 0.10 - 1.20 k/uL    Absolute Eos # <0.03 0.00 - 0.44 k/uL    Basophils Absolute <0.03 0.00 - 0.20 k/uL    Absolute Immature Granulocyte 0.03 0.00 - 0.30 k/uL    WBC Morphology NOT REPORTED     RBC Morphology NOT REPORTED     Platelet Estimate NOT REPORTED    COMPREHENSIVE METABOLIC PANEL    Collection Time: 12/23/20  1:23 AM   Result Value Ref Range    Glucose 105 (H) 70 - 99 mg/dL    BUN 12 6 - 20 mg/dL    CREATININE 0.80 0.50 - 0.90 mg/dL    Bun/Cre Ratio NOT REPORTED 9 - 20    Calcium 8.6 8.6 - 10.4 mg/dL    Sodium 136 135 - 144 mmol/L    Potassium 4.4 3.7 - 5.3 mmol/L    Chloride 102 98 - 107 mmol/L    CO2 19 (L) 20 - 31 mmol/L    Anion Gap 15 9 - 17 mmol/L    Alkaline Phosphatase 64 35 - 104 U/L    ALT 43 (H) 5 - 33 U/L    AST 57 (H) <32 U/L    Total Bilirubin 0.38 0.3 - 1.2 mg/dL    Total Protein 7.3 6.4 - 8.3 g/dL    Alb 3.9 3.5 - 5.2 g/dL    Albumin/Globulin Ratio 1.1 1.0 - 2.5    GFR Non-African American >60 >60 mL/min    GFR African American >60 >60 mL/min    GFR Comment          GFR Staging NOT REPORTED    Troponin Collection Time: 12/23/20  1:23 AM   Result Value Ref Range    Troponin, High Sensitivity <6 0 - 14 ng/L    Troponin T NOT REPORTED <0.03 ng/mL    Troponin Interp NOT REPORTED    D-DIMER, QUANTITATIVE    Collection Time: 12/23/20  1:23 AM   Result Value Ref Range    D-Dimer, Quant 0.57 mg/L FEU   C-REACTIVE PROTEIN    Collection Time: 12/23/20  1:23 AM   Result Value Ref Range    CRP 33.4 (H) 0.0 - 5.0 mg/L   Lactate, Sepsis    Collection Time: 12/23/20  1:23 AM   Result Value Ref Range    Lactic Acid, Sepsis NOT REPORTED 0.5 - 1.9 mmol/L    Lactic Acid, Sepsis, Whole Blood 1.1 0.5 - 1.9 mmol/L   Brain Natriuretic Peptide    Collection Time: 12/23/20  1:23 AM   Result Value Ref Range    Pro-BNP 24 <300 pg/mL    BNP Interpretation Pro-BNP Reference Range:    Sedimentation Rate    Collection Time: 12/23/20  1:23 AM   Result Value Ref Range    Sed Rate 55 (H) 0 - 30 mm   Protime-INR    Collection Time: 12/23/20  1:23 AM   Result Value Ref Range    Protime 9.4 9.0 - 12.0 sec    INR 0.9    APTT    Collection Time: 12/23/20  1:23 AM   Result Value Ref Range    PTT 21.7 20.5 - 30.5 sec   Fibrinogen    Collection Time: 12/23/20  1:23 AM   Result Value Ref Range    Fibrinogen 589 (H) 140 - 420 mg/dL   Procalcitonin    Collection Time: 12/23/20  1:23 AM   Result Value Ref Range    Procalcitonin 0.12 (H) <0.09 ng/mL   Lactate Dehydrogenase    Collection Time: 12/23/20  1:23 AM   Result Value Ref Range     (H) 135 - 214 U/L   Ferritin    Collection Time: 12/23/20  1:23 AM   Result Value Ref Range    Ferritin 248 (H) 13 - 150 ug/L   Urinalysis Reflex to Culture    Collection Time: 12/23/20  3:10 AM    Specimen: Urine, clean catch   Result Value Ref Range    Color, UA YELLOW YELLOW    Turbidity UA CLEAR CLEAR    Glucose, Ur NEGATIVE NEGATIVE    Bilirubin Urine NEGATIVE NEGATIVE    Ketones, Urine SMALL (A) NEGATIVE    Specific Gravity, UA 1.013 1.005 - 1.030    Urine Hgb NEGATIVE NEGATIVE    pH, UA 5.5 5.0 - 8.0 Protein, UA NEGATIVE NEGATIVE    Urobilinogen, Urine Normal Normal    Nitrite, Urine NEGATIVE NEGATIVE    Leukocyte Esterase, Urine SMALL (A) NEGATIVE    Urinalysis Comments NOT REPORTED    Microscopic Urinalysis    Collection Time: 12/23/20  3:10 AM   Result Value Ref Range    -          WBC, UA 2 TO 5 0 - 5 /HPF    RBC, UA 0 TO 2 0 - 4 /HPF    Casts UA NOT REPORTED 0 - 8 /LPF    Crystals, UA NOT REPORTED None /HPF    Epithelial Cells UA 0 TO 2 0 - 5 /HPF    Renal Epithelial, UA NOT REPORTED 0 /HPF    Bacteria, UA NOT REPORTED None    Mucus, UA NOT REPORTED None    Trichomonas, UA NOT REPORTED None    Amorphous, UA NOT REPORTED None    Other Observations UA NOT REPORTED NOT REQ. Yeast, UA NOT REPORTED None   Troponin    Collection Time: 12/23/20  3:12 AM   Result Value Ref Range    Troponin, High Sensitivity 7 0 - 14 ng/L    Troponin T NOT REPORTED <0.03 ng/mL    Troponin Interp NOT REPORTED    Lactate, Sepsis    Collection Time: 12/23/20  3:12 AM   Result Value Ref Range    Lactic Acid, Sepsis NOT REPORTED 0.5 - 1.9 mmol/L    Lactic Acid, Sepsis, Whole Blood 1.7 0.5 - 1.9 mmol/L   COVID-19    Collection Time: 12/23/20  3:28 AM    Specimen: Other   Result Value Ref Range    SARS-CoV-2          SARS-CoV-2, Rapid DETECTED (A) Not Detected    Source . NASOPHARYNGEAL SWAB     SARS-CoV-2             Imaging/Diagnostics:  Xr Chest Portable    Result Date: 12/23/2020  Mild diffuse interstitial prominence is seen which could be related to atypical/viral pneumonia or pulmonary vascular congestion.      Ct Chest Pulmonary Embolism W Contrast    Result Date: 12/23/2020 1.  No acute pulmonary thromboembolic disease. Prominent main pulmonary artery suggests pulmonary hypertension. 2.  Multifocal bilateral patchy ground-glass and heterogeneous opacities. Commonly reported imaging features of COVID-19 pneumonia are present. Other processes such as influenza pneumonia and organizing pneumonia, as can be seen with drug toxicity and connective tissue disease, can cause a similar imaging pattern. Assessment :      Hospital Problems           Last Modified POA    * (Principal) Pneumonia due to COVID-19 virus 12/23/2020 Yes    Morbid obesity with BMI of 40.0-44.9, adult ([facility] [address] 12/23/2020 Yes    Hypoxia 12/23/2020 Yes    CRP elevated 12/23/2020 Yes    Patient is L[employer]-day 12/23/2020 Yes          Plan:     Patient status inpatient in the Progressive Unit/Step down       COVID 19 Pneumonia : with hypoxia   Initiate  Remdesivir 12/23  Steroids started 12/22   Vit D/ C and zinc   Monitor inflammatory markers  Patient is a L[employer]-day and is refusing convalescent plasma    Morbid obesity is a complicating factor, patient might have underlying undiagnosed sleep apnea. DVT & GI PPx     Discussed with the patient and the nurse        Consultations:   IP CONSULT TO HOSPITALIST  PHARMACY TO DOSE MEDICATION     Patient is admitted as inpatient status because of co-morbidities listed above, severity of signs and symptoms as outlined, requirement for current medical therapies and most importantly because of direct risk to patient if care not provided in a hospital setting. Expected length of stay > 48 hours. Crissy Morton MD  12/23/2020  4:24 PM    Copy sent to Dr. Martinez primary care provider on file.

## 2020-12-23 NOTE — ED PROVIDER NOTES
[facility]  ED  Emergency Department Encounter  Emergency Medicine Resident     Pt Name: Gabriel Ware  MRN: [id number]  Armsmarygfmichelle 1965  Date ofevaluation: 12/23/20  PCP:  No primary care provider on file. CHIEF COMPLAINT       Chief Complaint   Patient presents with    Shortness of Breath     tested positive for COVID 9 days ago       HISTORY OF PRESENT ILLNESS  (Location/Symptom, Timing/Onset, Context/Setting, Quality, Duration, Modifying Factors, Severity, Associated signs/symptoms)     Gabriel Ware is a 50 y.o. female who presents acute onset of shortness of breath. Patient reports that she was recent exposed to Covid and had a positive test.  She has had symptoms for 9 days including a slight productive cough of clear sputum, shortness of breath, myalgias, arthralgias, fevers and chills. Denies any pain currently. No chest pain, abdominal pain, nausea vomiting or urinary complaints. PAST MEDICAL / SURGICAL / SOCIAL / FAMILY HISTORY      has no past medical history on file. has no past surgical history on file.     Social History     Socioeconomic History    Marital status:      Spouse name: Not on file    Number of children: Not on file    Years of education: Not on file    Highest education level: Not on file   Occupational History    Not on file   Social Needs    Financial resource strain: Not on file    Food insecurity     Worry: Not on file     Inability: Not on file    Transportation needs     Medical: Not on file     Non-medical: Not on file   Tobacco Use    Smoking status: Not on file   Substance and Sexual Activity    Alcohol use: Not on file    Drug use: Not on file    Sexual activity: Not on file   Lifestyle    Physical activity     Days per week: Not on file     Minutes per session: Not on file    Stress: Not on file   Relationships    Social connections     Talks on phone: Not on file     Gets together: Not on file Attends [employer] service: Not on file     Active member of club or organization: Not on file     Attends meetings of clubs or organizations: Not on file     Relationship status: Not on file    Intimate partner violence     Fear of current or ex partner: Not on file     Emotionally abused: Not on file     Physically abused: Not on file     Forced sexual activity: Not on file   Other Topics Concern    Not on file   Social History Narrative    Not on file       No family history on file. Allergies:  Patient has no known allergies. Home Medications:  Prior to Admission medications    Not on File       REVIEW OF SYSTEMS    (2-9 systems for level 4, 10 or more for level 5)      Review of Systems   Constitutional: Positive for chills and fever. HENT: Positive for rhinorrhea and sore throat. Eyes: Negative for redness and itching. Respiratory: Positive for cough and shortness of breath. Cardiovascular: Negative for chest pain. Gastrointestinal: Negative for abdominal pain, nausea and vomiting. Genitourinary: Negative for dysuria and frequency. Musculoskeletal: Positive for arthralgias and myalgias. Skin: Negative for rash and wound. Allergic/Immunologic: Negative for environmental allergies and food allergies. Neurological: Negative for weakness and numbness. PHYSICAL EXAM   (up to 7 for level 4, 8 or more for level 5)      INITIAL VITALS:   /73   Pulse 104   Temp 100.2 °F (37.9 °C) (Skin)   Resp 28   SpO2 94%     Physical Exam  Vitals signs and nursing note reviewed. Constitutional:       General: She is not in acute distress. Appearance: Normal appearance. She is obese. She is not ill-appearing, toxic-appearing or diaphoretic. HENT:      Head: Normocephalic and atraumatic. Eyes:      General: No scleral icterus. Conjunctiva/sclera: Conjunctivae normal.   Neck:      Musculoskeletal: Neck supple.    Cardiovascular: Rate and Rhythm: Regular rhythm. Tachycardia present. Pulmonary:      Effort: Pulmonary effort is normal. No respiratory distress. Breath sounds: No stridor. Rales (Bilerateral lower lobes) present. No wheezing. Abdominal:      General: There is no distension. Palpations: Abdomen is soft. There is no mass. Tenderness: There is no abdominal tenderness. There is no guarding or rebound. Musculoskeletal:      Right lower leg: No edema. Left lower leg: No edema. Skin:     General: Skin is warm and dry. Findings: No rash (over exposed skin). Neurological:      General: No focal deficit present. Mental Status: She is alert and oriented to person, place, and time.    Psychiatric:         Mood and Affect: Mood normal.         Behavior: Behavior normal.         DIAGNOSTICS     PLAN (LABS / IMAGING / EKG):  Orders Placed This Encounter   Procedures    XR CHEST PORTABLE    CT CHEST PULMONARY EMBOLISM W CONTRAST    CBC WITH AUTO DIFFERENTIAL    COMPREHENSIVE METABOLIC PANEL    Troponin    D-DIMER, QUANTITATIVE    C-REACTIVE PROTEIN    Lactate, Sepsis    Brain Natriuretic Peptide    Sedimentation Rate    Urinalysis Reflex to Culture    Protime-INR    APTT    Fibrinogen    Procalcitonin    Lactate Dehydrogenase    Ferritin    COVID-19    Microscopic Urinalysis    PPE Instructions    Inpatient consult to Hospitalist    Droplet Plus Isolation    EKG 12 Lead    PATIENT STATUS (FROM ED OR OR/PROCEDURAL) Inpatient       MEDICATIONS ORDERED:  Orders Placed This Encounter   Medications    OR Linked Order Group     acetaminophen (TYLENOL) tablet 650 mg     acetaminophen (TYLENOL) suppository 650 mg    dextromethorphan-guaiFENesin (ROBITUSSIN-DM)  MG/5ML liquid 5 mL    0.9 % sodium chloride bolus    iopamidol (ISOVUE-370) 76 % injection 75 mL    0.9 % sodium chloride bolus       DIAGNOSTIC RESULTS / EMERGENCYDEPARTMENT COURSE / MDM     LABS: Results for orders placed or performed during the hospital encounter of 12/23/20   CBC WITH AUTO DIFFERENTIAL   Result Value Ref Range    WBC 5.8 3.5 - 11.3 k/uL    RBC 4.76 3.95 - 5.11 m/uL    Hemoglobin 13.8 11.9 - 15.1 g/dL    Hematocrit 42.7 36.3 - 47.1 %    MCV 89.7 82.6 - 102.9 fL    MCH 29.0 25.2 - 33.5 pg    MCHC 32.3 28.4 - 34.8 g/dL    RDW 13.4 11.8 - 14.4 %    Platelets 939 799 - 795 k/uL    MPV 10.8 8.1 - 13.5 fL    NRBC Automated 0.0 0.0 per 100 WBC    Differential Type NOT REPORTED     Seg Neutrophils 66 (H) 36 - 65 %    Lymphocytes 26 24 - 43 %    Monocytes 7 3 - 12 %    Eosinophils % 0 (L) 1 - 4 %    Basophils 0 0 - 2 %    Immature Granulocytes 1 (H) 0 %    Segs Absolute 3.80 1.50 - 8.10 k/uL    Absolute Lymph # 1.53 1.10 - 3.70 k/uL    Absolute Mono # 0.42 0.10 - 1.20 k/uL    Absolute Eos # <0.03 0.00 - 0.44 k/uL    Basophils Absolute <0.03 0.00 - 0.20 k/uL    Absolute Immature Granulocyte 0.03 0.00 - 0.30 k/uL    WBC Morphology NOT REPORTED     RBC Morphology NOT REPORTED     Platelet Estimate NOT REPORTED    COMPREHENSIVE METABOLIC PANEL   Result Value Ref Range    Glucose 105 (H) 70 - 99 mg/dL    BUN 12 6 - 20 mg/dL    CREATININE 0.80 0.50 - 0.90 mg/dL    Bun/Cre Ratio NOT REPORTED 9 - 20    Calcium 8.6 8.6 - 10.4 mg/dL    Sodium 136 135 - 144 mmol/L    Potassium 4.4 3.7 - 5.3 mmol/L    Chloride 102 98 - 107 mmol/L    CO2 19 (L) 20 - 31 mmol/L    Anion Gap 15 9 - 17 mmol/L    Alkaline Phosphatase 64 35 - 104 U/L    ALT 43 (H) 5 - 33 U/L    AST 57 (H) <32 U/L    Total Bilirubin 0.38 0.3 - 1.2 mg/dL    Total Protein 7.3 6.4 - 8.3 g/dL    Alb 3.9 3.5 - 5.2 g/dL    Albumin/Globulin Ratio 1.1 1.0 - 2.5    GFR Non-African American >60 >60 mL/min    GFR African American >60 >60 mL/min    GFR Comment          GFR Staging NOT REPORTED    Troponin   Result Value Ref Range    Troponin, High Sensitivity <6 0 - 14 ng/L    Troponin T NOT REPORTED <0.03 ng/mL    Troponin Interp NOT REPORTED    Troponin Result Value Ref Range    Troponin, High Sensitivity 7 0 - 14 ng/L    Troponin T NOT REPORTED <0.03 ng/mL    Troponin Interp NOT REPORTED    D-DIMER, QUANTITATIVE   Result Value Ref Range    D-Dimer, Quant 0.57 mg/L FEU   C-REACTIVE PROTEIN   Result Value Ref Range    CRP 33.4 (H) 0.0 - 5.0 mg/L   Lactate, Sepsis   Result Value Ref Range    Lactic Acid, Sepsis NOT REPORTED 0.5 - 1.9 mmol/L    Lactic Acid, Sepsis, Whole Blood 1.1 0.5 - 1.9 mmol/L   Lactate, Sepsis   Result Value Ref Range    Lactic Acid, Sepsis NOT REPORTED 0.5 - 1.9 mmol/L    Lactic Acid, Sepsis, Whole Blood 1.7 0.5 - 1.9 mmol/L   Brain Natriuretic Peptide   Result Value Ref Range    Pro-BNP 24 <300 pg/mL    BNP Interpretation Pro-BNP Reference Range:    Sedimentation Rate   Result Value Ref Range    Sed Rate 55 (H) 0 - 30 mm   Urinalysis Reflex to Culture    Specimen: Urine, clean catch   Result Value Ref Range    Color, UA YELLOW YELLOW    Turbidity UA CLEAR CLEAR    Glucose, Ur NEGATIVE NEGATIVE    Bilirubin Urine NEGATIVE NEGATIVE    Ketones, Urine SMALL (A) NEGATIVE    Specific Gravity, UA 1.013 1.005 - 1.030    Urine Hgb NEGATIVE NEGATIVE    pH, UA 5.5 5.0 - 8.0    Protein, UA NEGATIVE NEGATIVE    Urobilinogen, Urine Normal Normal    Nitrite, Urine NEGATIVE NEGATIVE    Leukocyte Esterase, Urine SMALL (A) NEGATIVE    Urinalysis Comments NOT REPORTED    Protime-INR   Result Value Ref Range    Protime 9.4 9.0 - 12.0 sec    INR 0.9    APTT   Result Value Ref Range    PTT 21.7 20.5 - 30.5 sec   Fibrinogen   Result Value Ref Range    Fibrinogen 589 (H) 140 - 420 mg/dL   Procalcitonin   Result Value Ref Range    Procalcitonin 0.12 (H) <0.09 ng/mL   Lactate Dehydrogenase   Result Value Ref Range     (H) 135 - 214 U/L   Ferritin   Result Value Ref Range    Ferritin 248 (H) 13 - 150 ug/L   COVID-19    Specimen: Other   Result Value Ref Range    SARS-CoV-2          SARS-CoV-2, Rapid DETECTED (A) Not Detected    Source . NASOPHARYNGEAL SWAB SARS-CoV-2         Microscopic Urinalysis   Result Value Ref Range    -          WBC, UA 2 TO 5 0 - 5 /HPF    RBC, UA 0 TO 2 0 - 4 /HPF    Casts UA NOT REPORTED 0 - 8 /LPF    Crystals, UA NOT REPORTED None /HPF    Epithelial Cells UA 0 TO 2 0 - 5 /HPF    Renal Epithelial, UA NOT REPORTED 0 /HPF    Bacteria, UA NOT REPORTED None    Mucus, UA NOT REPORTED None    Trichomonas, UA NOT REPORTED None    Amorphous, UA NOT REPORTED None    Other Observations UA NOT REPORTED NOT REQ. Yeast, UA NOT REPORTED None       RADIOLOGY:  CT CHEST PULMONARY EMBOLISM W CONTRAST   Final Result   1. No acute pulmonary thromboembolic disease. Prominent main pulmonary   artery suggests pulmonary hypertension. 2.  Multifocal bilateral patchy ground-glass and heterogeneous opacities. Commonly reported imaging features of COVID-19 pneumonia are present. Other   processes such as influenza pneumonia and organizing pneumonia, as can be   seen with drug toxicity and connective tissue disease, can cause a similar   imaging pattern. XR CHEST PORTABLE   Preliminary Result   Mild diffuse interstitial prominence is seen which could be related to   atypical/viral pneumonia or pulmonary vascular congestion.               EKG  Rhythm: sinus tachycardia  Rate: tachycardia  Axis: left  Ectopy: none  Conduction: right bundle branch block (incomplete)  ST Segments: normal  T Waves: normal  Q Waves: none    Clinical Impression: non-specific EKG    Normal Interval Reference:  P-wave <110 ms  -200 ms  QRS <100 ms  QT <420 ms  QTc 330-470 ms    All EKG's are interpreted by the Emergency Department Physician who either signs or Co-signsthis chart in the absence of a cardiologist.    EMERGENCY DEPARTMENT COURSE: MDM: 65-year-old female presenting with Covid symptoms for last 9 days. Recent positive outpatient Covid test.  On exam she appears uncomfortable but is nontoxic in no acute distress. She is tachycardic as well as hypoxic on room air down to 88%. Borderline febrile here. Heart is tachycardic but regular rhythm, lungs with rales in the bases. Abdomen soft nontender. No no lower extremity edema bilaterally. Differential diagnosis includes pneumonia, Covid, PE, ACS, heart failure, among others. Plan is for lab work, chest x-ray, EKG, likely admission. Patient again tested Covid positive here. Has groundglass opacities on CT scan that [facility] requested given elevated D-dimer. No evidence of pulmonary embolism or heart failure. Patient will be admitted to the [facility] service for further evaluation and management. PROCEDURES:  none    CONSULTS:  IP CONSULT TO HOSPITALIST    FINAL IMPRESSION      1. COVID-19    2. Hypoxia          DISPOSITION / PLAN     DISPOSITION Admitted 12/23/2020 03:14:37 AM      PATIENT REFERRED TO:  No follow-up provider specified.     DISCHARGE MEDICATIONS:  New Prescriptions    No medications on file       Dacia Ragsdale DO  Emergency Medicine Resident  [address] [facility]    (Please note that portions of this note were completed with a voice recognition program.  Efforts were made to edit thedictations but occasionally words are mis-transcribed.)       Dacia Ragsdale DO  Resident  12/23/20 [id number]

## 2020-12-23 NOTE — ED NOTES
Writer attempted to complete ACP documents. Daughter of patient is nurse in ED, so writer asked RN at patient's station to have daughter come to social work station to assist in possibly providing information to update ACP documents if able since spouse is unable to assist at this time.       TORRES Ulloa  12/23/20 [id number]

## 2020-12-23 NOTE — PROGRESS NOTES
Pharmacy Note  COVID-19 Anticoagulation Adjustment    Colton Baker is a 50 y.o. female. Pharmacist assessment of anticoagulation in a SARS-CoV-2 positive patient. Recent Labs     12/23/20  0123   BUN 12       Recent Labs     12/23/20  0123   CREATININE 0.80     Recent Labs     12/23/20  0123   DDIMER 0.57        Estimated Creatinine Clearance: 103 mL/min (based on SCr of 0.8 mg/dL). Height:   Ht Readings from Last 1 Encounters:   12/23/20 5' 5\" (1.651 m)     Weight:  Wt Readings from Last 1 Encounters:   12/23/20 254 lb (115.2 kg)     BMI:  Body mass index is 42.27 kg/m².       Per the [address] [place] [address],[address] Anticoagulation Algorithm for COVID-19 positive or clinically-suspected patients, the following adjustment has been made per P&T Guidelines:             Enoxaparin changed to 40 mg SC BID for CrCl > 30 and BMI > 30    Thank you,  Sonia Ambriz, PharmD, BCPS  12/23/2020  3:25 PM

## 2020-12-24 LAB
ANION GAP SERPL CALCULATED.3IONS-SCNC: 11 MMOL/L (ref 9–17)
BUN BLDV-MCNC: 15 MG/DL (ref 6–20)
BUN/CREAT BLD: ABNORMAL (ref 9–20)
CALCIUM SERPL-MCNC: 8.5 MG/DL (ref 8.6–10.4)
CHLORIDE BLD-SCNC: 106 MMOL/L (ref 98–107)
CO2: 23 MMOL/L (ref 20–31)
CREAT SERPL-MCNC: 0.73 MG/DL (ref 0.5–0.9)
FIBRINOGEN: 539 MG/DL (ref 140–420)
GFR AFRICAN AMERICAN: >60 ML/MIN
GFR NON-AFRICAN AMERICAN: >60 ML/MIN
GFR SERPL CREATININE-BSD FRML MDRD: ABNORMAL ML/MIN/{1.73_M2}
GFR SERPL CREATININE-BSD FRML MDRD: ABNORMAL ML/MIN/{1.73_M2}
GLUCOSE BLD-MCNC: 94 MG/DL (ref 70–99)
HCT VFR BLD CALC: 39.7 % (ref 36.3–47.1)
HEMOGLOBIN: 12.4 G/DL (ref 11.9–15.1)
INR BLD: 0.9
MCH RBC QN AUTO: 28.5 PG (ref 25.2–33.5)
MCHC RBC AUTO-ENTMCNC: 31.2 G/DL (ref 28.4–34.8)
MCV RBC AUTO: 91.3 FL (ref 82.6–102.9)
NRBC AUTOMATED: 0 PER 100 WBC
PARTIAL THROMBOPLASTIN TIME: 27.6 SEC (ref 20.5–30.5)
PDW BLD-RTO: 13.5 % (ref 11.8–14.4)
PLATELET # BLD: 219 K/UL (ref 138–453)
PMV BLD AUTO: 10.2 FL (ref 8.1–13.5)
POTASSIUM SERPL-SCNC: 3.8 MMOL/L (ref 3.7–5.3)
PROTHROMBIN TIME: 9.7 SEC (ref 9–12)
RBC # BLD: 4.35 M/UL (ref 3.95–5.11)
SODIUM BLD-SCNC: 140 MMOL/L (ref 135–144)
WBC # BLD: 5.7 K/UL (ref 3.5–11.3)

## 2020-12-24 PROCEDURE — XW033E5 INTRODUCTION OF REMDESIVIR ANTI-INFECTIVE INTO PERIPHERAL VEIN, PERCUTANEOUS APPROACH, NEW TECHNOLOGY GROUP 5: ICD-10-PCS | Performed by: FAMILY MEDICINE

## 2020-12-24 PROCEDURE — 85027 COMPLETE CBC AUTOMATED: CPT

## 2020-12-24 PROCEDURE — 94761 N-INVAS EAR/PLS OXIMETRY MLT: CPT

## 2020-12-24 PROCEDURE — 85384 FIBRINOGEN ACTIVITY: CPT

## 2020-12-24 PROCEDURE — 2700000000 HC OXYGEN THERAPY PER DAY

## 2020-12-24 PROCEDURE — 36415 COLL VENOUS BLD VENIPUNCTURE: CPT

## 2020-12-24 PROCEDURE — 6370000000 HC RX 637 (ALT 250 FOR IP): Performed by: FAMILY MEDICINE

## 2020-12-24 PROCEDURE — 2580000003 HC RX 258: Performed by: FAMILY MEDICINE

## 2020-12-24 PROCEDURE — 6360000002 HC RX W HCPCS: Performed by: NURSE PRACTITIONER

## 2020-12-24 PROCEDURE — 6370000000 HC RX 637 (ALT 250 FOR IP): Performed by: NURSE PRACTITIONER

## 2020-12-24 PROCEDURE — 99254 IP/OBS CNSLTJ NEW/EST MOD 60: CPT | Performed by: INTERNAL MEDICINE

## 2020-12-24 PROCEDURE — 85610 PROTHROMBIN TIME: CPT

## 2020-12-24 PROCEDURE — 1200000000 HC SEMI PRIVATE

## 2020-12-24 PROCEDURE — 2500000003 HC RX 250 WO HCPCS: Performed by: FAMILY MEDICINE

## 2020-12-24 PROCEDURE — 2580000003 HC RX 258: Performed by: NURSE PRACTITIONER

## 2020-12-24 PROCEDURE — 80048 BASIC METABOLIC PNL TOTAL CA: CPT

## 2020-12-24 PROCEDURE — 85730 THROMBOPLASTIN TIME PARTIAL: CPT

## 2020-12-24 PROCEDURE — 99232 SBSQ HOSP IP/OBS MODERATE 35: CPT | Performed by: FAMILY MEDICINE

## 2020-12-24 RX ORDER — DIPHENHYDRAMINE HCL 25 MG
25 TABLET ORAL NIGHTLY PRN
Status: DISCONTINUED | OUTPATIENT
Start: 2020-12-24 | End: 2020-12-27 | Stop reason: HOSPADM

## 2020-12-24 RX ADMIN — ZINC SULFATE 220 MG (50 MG) CAPSULE 50 MG: CAPSULE at 21:36

## 2020-12-24 RX ADMIN — OXYCODONE HYDROCHLORIDE AND ACETAMINOPHEN 500 MG: 500 TABLET ORAL at 21:36

## 2020-12-24 RX ADMIN — SODIUM CHLORIDE, PRESERVATIVE FREE 10 ML: 5 INJECTION INTRAVENOUS at 21:37

## 2020-12-24 RX ADMIN — ZINC SULFATE 220 MG (50 MG) CAPSULE 50 MG: CAPSULE at 09:25

## 2020-12-24 RX ADMIN — OXYCODONE HYDROCHLORIDE AND ACETAMINOPHEN 500 MG: 500 TABLET ORAL at 09:26

## 2020-12-24 RX ADMIN — REMDESIVIR 100 MG: 100 INJECTION, POWDER, LYOPHILIZED, FOR SOLUTION INTRAVENOUS at 13:27

## 2020-12-24 RX ADMIN — ENOXAPARIN SODIUM 40 MG: 40 INJECTION SUBCUTANEOUS at 09:26

## 2020-12-24 RX ADMIN — DEXAMETHASONE 6 MG: 4 TABLET ORAL at 09:26

## 2020-12-24 RX ADMIN — ENOXAPARIN SODIUM 40 MG: 40 INJECTION SUBCUTANEOUS at 21:36

## 2020-12-24 RX ADMIN — ACETAMINOPHEN 650 MG: 325 TABLET ORAL at 09:27

## 2020-12-24 RX ADMIN — Medication 6000 UNITS: at 09:25

## 2020-12-24 ASSESSMENT — PAIN DESCRIPTION - LOCATION: LOCATION: HEAD

## 2020-12-24 ASSESSMENT — PAIN DESCRIPTION - FREQUENCY: FREQUENCY: INTERMITTENT

## 2020-12-24 ASSESSMENT — PAIN DESCRIPTION - DESCRIPTORS: DESCRIPTORS: HEADACHE

## 2020-12-24 ASSESSMENT — PAIN SCALES - GENERAL
PAINLEVEL_OUTOF10: 3
PAINLEVEL_OUTOF10: 0
PAINLEVEL_OUTOF10: 0

## 2020-12-24 ASSESSMENT — PAIN - FUNCTIONAL ASSESSMENT: PAIN_FUNCTIONAL_ASSESSMENT: ACTIVITIES ARE NOT PREVENTED

## 2020-12-24 ASSESSMENT — PAIN DESCRIPTION - PAIN TYPE: TYPE: ACUTE PAIN

## 2020-12-24 ASSESSMENT — PAIN DESCRIPTION - ONSET: ONSET: GRADUAL

## 2020-12-24 NOTE — PLAN OF CARE
Problem: Airway Clearance - Ineffective  Goal: Achieve or maintain patent airway  12/24/2020 1119 by Breanna Ivan RN  Outcome: Ongoing  12/24/2020 0610 by Zakia Purcell RN  Outcome: Met This Shift     Problem: Gas Exchange - Impaired  Goal: Absence of hypoxia  12/24/2020 1119 by Breanna Ivan RN  Outcome: Ongoing  12/24/2020 0610 by Zakia Purcell RN  Outcome: Met This Shift  Goal: Promote optimal lung function  12/24/2020 1119 by Breanna Ivan RN  Outcome: Ongoing  12/24/2020 0610 by Zakia Purcell RN  Outcome: Met This Shift     Problem: Breathing Pattern - Ineffective  Goal: Ability to achieve and maintain a regular respiratory rate  12/24/2020 1119 by Breanna Ivan RN  Outcome: Ongoing  12/24/2020 0610 by Zakia Purcell RN  Outcome: Met This Shift     Problem:  Body Temperature -  Risk of, Imbalanced  Goal: Ability to maintain a body temperature within defined limits  12/24/2020 1119 by Breanna Ivan RN  Outcome: Ongoing  12/24/2020 0610 by Zakia Purcell RN  Outcome: Met This Shift  Goal: Will regain or maintain usual level of consciousness  12/24/2020 1119 by Breanna Ivan RN  Outcome: Ongoing  12/24/2020 0610 by Zakia Purcell RN  Outcome: Met This Shift  Goal: Complications related to the disease process, condition or treatment will be avoided or minimized  12/24/2020 1119 by Breanna Ivan RN  Outcome: Ongoing  12/24/2020 0610 by Zakia Purcell RN  Outcome: Met This Shift     Problem: Isolation Precautions - Risk of Spread of Infection  Goal: Prevent transmission of infection  12/24/2020 1119 by Breanna Ivan RN  Outcome: Ongoing  12/24/2020 0610 by Zakia Purcell RN  Outcome: Met This Shift     Problem: Nutrition Deficits  Goal: Optimize nutrtional status  12/24/2020 1119 by Breanna Ivan RN  Outcome: Ongoing  12/24/2020 0610 by Zakia Purcell RN  Outcome: Met This Shift     Problem: Risk for Fluid Volume Deficit  Goal: Maintain normal heart rhythm 12/24/2020 1119 by Narendra Maddox RN  Outcome: Ongoing  12/24/2020 0610 by Lex Armstrong RN  Outcome: Met This Shift  Goal: Maintain absence of muscle cramping  12/24/2020 1119 by Narendra Maddox RN  Outcome: Ongoing  12/24/2020 0610 by Lex Armstrong RN  Outcome: Met This Shift  Goal: Maintain normal serum potassium, sodium, calcium, phosphorus, and pH  12/24/2020 1119 by Narendra Maddox RN  Outcome: Ongoing  12/24/2020 0610 by Lex Armstrong RN  Outcome: Met This Shift     Problem: Loneliness or Risk for Loneliness  Goal: Demonstrate positive use of time alone when socialization is not possible  12/24/2020 1119 by Narendra Maddox RN  Outcome: Ongoing  12/24/2020 0610 by Lex Armstrong RN  Outcome: Met This Shift     Problem: Fatigue  Goal: Verbalize increase energy and improved vitality  12/24/2020 1119 by Narendra Maddox RN  Outcome: Ongoing  12/24/2020 0610 by Lex Armstrong RN  Outcome: Met This Shift     Problem: Patient Education: Go to Patient Education Activity  Goal: Patient/Family Education  12/24/2020 1119 by Narendra Maddox RN  Outcome: Ongoing  12/24/2020 0610 by Lex Armstrong RN  Outcome: Met This Shift     Problem: Pain:  Goal: Pain level will decrease  Description: Pain level will decrease  12/24/2020 1119 by Narendra Maddox RN  Outcome: Ongoing  12/24/2020 0610 by Lex Armstrong RN  Outcome: Met This Shift  Goal: Control of acute pain  Description: Control of acute pain  12/24/2020 1119 by Narendra Maddox RN  Outcome: Ongoing  12/24/2020 0610 by Lex Armstrong RN  Outcome: Met This Shift  Goal: Control of chronic pain  Description: Control of chronic pain  12/24/2020 1119 by Narendra Maddox RN  Outcome: Ongoing  12/24/2020 0610 by Lex Armstrong RN  Outcome: Met This Shift

## 2020-12-24 NOTE — PROGRESS NOTES
[facility]  Office: [address], DO, Racheal Melbeta, DO, Gamaliel Martins, DO, Anne-Marie [place] Blood, DO, Carlene Pandya MD, Mitzy Zuluaga MD, Sylvester Lackey MD, Gabino Arroyo MD, Brian Hansen MD, Radha Small MD, Maximino Vines MD, Jay Welsh MD, Mbonu Cassandria Favre, MD, Sami Huang DO, Raquel Hidalgo MD, Lorraine Miles MD, Pattie Reed, DO, Sebastian Rock MD,  Saintclair Lank, DO, Ursula Bear MD, Michelle Peters MD, Daryle Springer, [facility], [facility] Nichole, CNP, Roselia Hamilton, CNP, Heath Jones, CNS, Lesly Carter, CNP, Chandrakant Zarco, CNP, Elham Farah, CNP, Paul Jin, CNP, Anjel Leroy, CNP, Shannon Holcomb PA-C, Delvis Gomez [facility], Marimar Lr, CNP, Puneet Talavera, CNP, Matthew Quevedo, CNP, Heather Holt, CNP, Kelly Sueros, [address] [place] [address]    Progress Note    12/24/2020    4:31 PM    Name:   Rita Logan  MRN:     [id number]     Acct:      [de-identified]   Room:   2024/2024-01   Day:  1  Admit Date:  12/23/2020 12:43 AM    PCP:   No primary care provider on file. Code Status:  Full Code    Subjective:     C/C:   Chief Complaint   Patient presents with    Shortness of Breath     tested positive for COVID 9 days ago     Interval History Status:  Not changed  Patient seen and examined at bedside, no acute events overnight. Feels okay but very fatigued today  On 2 L NC  Patient denies any chest pain,chills, fevers, nausea or vomiting. Patient vitals, labs and all providers notes were reviewed,from overnight shift and morning updates were noted and discussed with the nurse    Brief History:        Rita Logan is a 50 y.o.  Non-/non  female who presents with Shortness of Breath (tested positive for COVID 9 days ago)   and is admitted to the hospital for the management of COVID-19 positive test (U07.1, COVID-19) with Acute Pneumonia and hypoxia /70   Pulse 82   Temp 98.6 °F (37 °C) (Oral)   Resp 21   Ht 5' 5\" (1.651 m)   Wt 256 lb 6.7 oz (116.3 kg)   SpO2 91%   BMI 42.67 kg/m²   Temp (24hrs), Av.7 °F (37.1 °C), Min:98.6 °F (37 °C), Max:98.8 °F (37.1 °C)    No results for input(s): POCGLU in the last 72 hours. I/O (24Hr): Intake/Output Summary (Last 24 hours) at 2020 1631  Last data filed at 2020 1819  Gross per 24 hour   Intake 1100 ml   Output    Net 1100 ml       Labs:  Hematology:  Recent Labs     20  0123 12/24/20  0449   WBC 5.8 5.7   RBC 4.76 4.35   HGB 13.8 12.4   HCT 42.7 39.7   MCV 89.7 91.3   MCH 29.0 28.5   MCHC 32.3 31.2   RDW 13.4 13.5    219   MPV 10.8 10.2   SEDRATE 55*  --    CRP 33.4*  --    INR 0.9 0.9   DDIMER 0.57  --      Chemistry:  Recent Labs     20  0123 202 20     --  140   K 4.4  --  3.8     --  106   CO2 19*  --  23   GLUCOSE 105*  --  94   BUN 12  --  15   CREATININE 0.80  --  0.73   ANIONGAP 15  --  11   LABGLOM >60  --  >60   GFRAA >60  --  >60   CALCIUM 8.6  --  8.5*   PROBNP 24  --   --    TROPHS <6 7  --      Recent Labs     20   PROT 7.3   LABALBU 3.9   LABA1C 5.2   AST 57*   ALT 43*   *   ALKPHOS 64   BILITOT 0.38     ABG:No results found for: POCPH, PHART, PH, POCPCO2, JLF4WBH, PCO2, POCPO2, PO2ART, PO2, POCHCO3, ENJ6VVZ, HCO3, NBEA, PBEA, BEART, BE, THGBART, THB, GJF4MEB, OFYY2VNP, R6QNSIZQ, O2SAT, FIO2  No results found for: SPECIAL  No results found for: CULTURE    Radiology:  Xr Chest Portable    Result Date: 2020  Mild diffuse interstitial prominence is seen which could be related to atypical/viral pneumonia or pulmonary vascular congestion.      Ct Chest Pulmonary Embolism W Contrast    Result Date: 2020 1.  No acute pulmonary thromboembolic disease. Prominent main pulmonary artery suggests pulmonary hypertension. 2.  Multifocal bilateral patchy ground-glass and heterogeneous opacities. Commonly reported imaging features of COVID-19 pneumonia are present. Other processes such as influenza pneumonia and organizing pneumonia, as can be seen with drug toxicity and connective tissue disease, can cause a similar imaging pattern. Physical Examination:        General appearance:  alert, cooperative and no distress  Mental Status:  oriented to person, place and time and normal affect  Lungs:  Slightly decreased AE, normal effort  Heart:  regular rate and rhythm, no murmur  Abdomen:  soft, nontender, nondistended, normal bowel sounds, no masses, hepatomegaly, splenomegaly  Extremities:  no edema, redness, tenderness in the calves  Skin:  no gross lesions, rashes, induration    Assessment:        Hospital Problems           Last Modified POA    * (Principal) Pneumonia due to COVID-19 virus 12/23/2020 Yes    Morbid obesity with BMI of 40.0-44.9, adult ([facility] [address] 12/23/2020 Yes    Hypoxia 12/23/2020 Yes    CRP elevated 12/23/2020 Yes    Patient is Jainism 12/23/2020 Yes          Plan:        COVID 19 Pneumonia : with hypoxia   Initiated  Remdesivir 12/23  Steroids started 12/22   Vit D/ C and zinc   Monitor inflammatory markers  Patient is a Jainism and is refusing convalescent plasma   ID consulted   To be approached by research team    Morbid obesity is a complicating factor, patient might have underlying undiagnosed sleep apnea.     DVT & GI PPx     Discussed with the patient and the nurse      Anthony Chan MD  12/24/2020  4:31 PM

## 2020-12-24 NOTE — CONSULTS
CXR showed bilateral infiltrates. Patient admitted because of concerns with COVID 19 and hypoxia. CURRENT EVALUATION : 12/24/2020    Afebrile  VS stable    Patient exhibiting respiratory distress. yes  Respiratory secretions: no    Patient receiving supplemental oxygen. 3 L NC  02 sat 92  RR 32      QTc:           NEWS Score: 0-4 Low risk group; 5-6: Medium risk group; 7 or above: High risk group  Parameters 3 2 1 0 1 2 3   Age    < 65   ? 65   RR ? 8  9-11 12-20  21-24 ? 25   O2 Sats ? 91 92-93 94-95 ? 96      Suppl O2  Yes  No      SBP ? 90  101-110 111-219   ? 220   HR ? 40  41-50 51-90  111-130 ? 131   Consciousness    Alert   Drowsiness, lethargy, or confusion   Temperature ? 35.0 C (95.0 F)  35.1-36.0 C 95.1-96.9 F 36.1-38.0 C 97.0-100.4 F 38.1-39.0 C 100.5-102.3 F ? 39.1 C ? 102.4 F      NEWS Score:  ? 12-24-20: 8 high risk    Overall Daily Picture:     ? Worsening    Presence of secondary bacterial Infection:    No   Additional antibiotics: no    Labs, X rays reviewed: 12/24/2020    BUN:15  Cr:0.73    WBC:5.7  Hb:12.4  Plat: 219    Absolute Neutrophils:3.8  Absolute Lymphocytes:1.53  Neutrophil/Lymphocyte Ratio: 2.5 low risk    CRP: 33.4  Ferritin:248  LDH: 439    Pro Calcitonin:      Cultures:  Urine:  ·   Blood:  ·   Sputum :  ·   Wound:  ? CXR:   ·   CAT:      Discussed with patient, RN, CC, IM. I have personally reviewed the past medical history, past surgical history, medications, social history, and family history, and I have updated the database accordingly. Past Medical History:   History reviewed. No pertinent past medical history. Past Surgical  History:   History reviewed. No pertinent surgical history.     Medications:      sodium chloride flush  10 mL Intravenous 2 times per day    Vitamin D  6,000 Units Oral Daily    dexamethasone  6 mg Oral Daily    remdesivir IVPB  100 mg Intravenous Q24H    enoxaparin  40 mg Subcutaneous BID    zinc sulfate  50 mg Oral BID  ascorbic acid  500 mg Oral BID       Social History:     Social History     Socioeconomic History    Marital status:      Spouse name: Not on file    Number of children: Not on file    Years of education: Not on file    Highest education level: Not on file   Occupational History    Not on file   Social Needs    Financial resource strain: Not on file    Food insecurity     Worry: Not on file     Inability: Not on file    Transportation needs     Medical: Not on file     Non-medical: Not on file   Tobacco Use    Smoking status: Never Smoker    Smokeless tobacco: Never Used   Substance and Sexual Activity    Alcohol use: Not on file    Drug use: Not on file    Sexual activity: Not on file   Lifestyle    Physical activity     Days per week: Not on file     Minutes per session: Not on file    Stress: Not on file   Relationships    Social connections     Talks on phone: Not on file     Gets together: Not on file     Attends Buddhist service: Not on file     Active member of club or organization: Not on file     Attends meetings of clubs or organizations: Not on file     Relationship status: Not on file    Intimate partner violence     Fear of current or ex partner: Not on file     Emotionally abused: Not on file     Physically abused: Not on file     Forced sexual activity: Not on file   Other Topics Concern    Not on file   Social History Narrative    Not on file       Family History:     Family History   Problem Relation Age of Onset    Diabetes Sister     Coronary Art Dis Mother         Allergies:   Patient has no known allergies. Review of Systems:       Constitutional: No fevers or chills. No systemic complaints  Head: No headaches  Eyes: No double vision or blurry vision. No conjunctival inflammation. ENT: No sore throat or runny nose. . No hearing loss, tinnitus or vertigo. Cardiovascular: No chest pain or palpitations. Shortness of breath.  SANTOS Lung: Shortness of breath or cough. No sputum production  Abdomen: No nausea, vomiting, diarrhea, or abdominal pain. Yu Trina No cramps. Genitourinary: No increased urinary frequency, or dysuria. No hematuria. No suprapubic or CVA pain  Musculoskeletal: No muscle aches or pains. No joint effusions, swelling or deformities  Hematologic: No bleeding or bruising. Neurologic: No headache, weakness, numbness, or tingling. Integument: No rash, no ulcers. Psychiatric: No depression. Endocrine: No polyuria, no polydipsia, no polyphagia. Physical Examination :     Patient Vitals for the past 8 hrs:   Weight   12/24/20 0600 256 lb 6.7 oz (116.3 kg)     General Appearance: Awake, alert, and in mild apparent distress. Heavy set  Head:  Normocephalic, no trauma  Eyes: Pupils equal, round, reactive to light; sclera anicteric; conjunctivae pink. No embolic phenomena. ENT: Oropharynx clear, without erythema, exudate, or thrush. No tenderness of sinuses. Mouth/throat: mucosa pink and moist. No lesions. Dentition in good repair. Neck:Supple, without lymphadenopathy. Thyroid normal, No bruits. Pulmonary/Chest: Decreased breath sounds, distant sounds. Cardiovascular: Regular rate and rhythm without murmurs, rubs, or gallops. Abdomen: Soft, non tender. Bowel sounds normal. No organomegaly  All four Extremities: No cyanosis, clubbing, edema, or effusions. Neurologic: No gross sensory or motor deficits. Skin: Warm and dry with good turgor. No signs of peripheral arterial or venous insufficiency. No ulcerations. No open wounds.     Medical Decision Making -Laboratory:   I have independently reviewed/ordered the following labs:    CBC with Differential:   Recent Labs     12/23/20  0123 12/24/20 0449   WBC 5.8 5.7   HGB 13.8 12.4   HCT 42.7 39.7    219   LYMPHOPCT 26  --    MONOPCT 7  --      BMP:   Recent Labs     12/23/20 0123 12/24/20 0449    140   K 4.4 3.8    106   CO2 19* 23   BUN 12 15 CREATININE 0.80 0.73     Hepatic Function Panel:   Recent Labs     12/23/20  0123   PROT 7.3   LABALBU 3.9   BILITOT 0.38   ALKPHOS 64   ALT 43*   AST 57*     No results for input(s): RPR in the last 72 hours. No results for input(s): HIV in the last 72 hours. No results for input(s): BC in the last 72 hours. Lab Results   Component Value Date    MUCUS NOT REPORTED 12/23/2020    RBC 4.35 12/24/2020    TRICHOMONAS NOT REPORTED 12/23/2020    WBC 5.7 12/24/2020    YEAST NOT REPORTED 12/23/2020    TURBIDITY CLEAR 12/23/2020     Lab Results   Component Value Date    CREATININE 0.73 12/24/2020    GLUCOSE 94 12/24/2020       Medical Decision Making-Imaging:     EXAMINATION:   CTA OF THE CHEST 12/23/2020 2:57 am       TECHNIQUE:   CTA of the chest was performed after the administration of intravenous   contrast.  Multiplanar reformatted images are provided for review.  MIP   images are provided for review.  Dose modulation, iterative reconstruction,   and/or weight based adjustment of the mA/kV was utilized to reduce the   radiation dose to as low as reasonably achievable.       COMPARISON:   None.       HISTORY:   ORDERING SYSTEM PROVIDED HISTORY: r/o PE   TECHNOLOGIST PROVIDED HISTORY:   r/o PE   Reason for Exam: r/o PE   Acuity: Acute   Type of Exam: Initial       FINDINGS:   Pulmonary Arteries: No acute pulmonary thromboembolic disease.  Prominent   main pulmonary artery measures 3.5 cm in diameter suggesting pulmonary   hypertension.  No right ventricular strain.       Mediastinum: No evidence of mediastinal lymphadenopathy.  The heart and   pericardium demonstrate no acute abnormality.  There is no acute abnormality   of the thoracic aorta.       Lungs/pleura: Respiratory motion.  Multifocal bilateral patchy ground-glass   and heterogeneous opacities.  No lobar consolidation.  No suspicious   pulmonary mass.  No endoluminal lesion.  No pleural effusion or pneumothorax.     Upper Abdomen: Limited images of the upper abdomen are unremarkable.       Soft Tissues/Bones: No acute bone or soft tissue abnormality.           Impression   1.  No acute pulmonary thromboembolic disease.  Prominent main pulmonary   artery suggests pulmonary hypertension.       2.  Multifocal bilateral patchy ground-glass and heterogeneous opacities. Commonly reported imaging features of COVID-19 pneumonia are present.  Other   processes such as influenza pneumonia and organizing pneumonia, as can be   seen with drug toxicity and connective tissue disease, can cause a similar   imaging pattern.             EXAMINATION:   ONE XRAY VIEW OF THE CHEST       12/23/2020 1:19 am       COMPARISON:   None.       HISTORY:   ORDERING SYSTEM PROVIDED HISTORY: COugh SOB, covid +   TECHNOLOGIST PROVIDED HISTORY:   COugh SOB, covid +   Reason for Exam: cough, shortness of breath   upright port       FINDINGS:   The cardiomediastinal silhouette is within normal limits.  Diffuse   interstitial prominence is noted.  There is no pleural effusion or   pneumothorax.           Impression   Mild diffuse interstitial prominence is seen which could be related to   atypical/viral pneumonia or pulmonary vascular congestion. Medical Decision [id number]:       Medical Decision Making-Other:     Note:  · Labs, medications, radiologic studies were reviewed with personal review of films  · Large amounts of data were reviewed  · Discussed with nursing Staff, Discharge planner  · Infection Control and Prevention measures reviewed  · All prior entries were reviewed  · Administer medications as ordered  · Prognosis: Guarded  · Discharge planning reviewed  · Follow up as outpatient. Thank you for allowing us to participate in the care of this patient. Please call with questions.     Arnoldo Claude, MD  Pager: [phone] - Office: [phone]

## 2020-12-24 NOTE — FLOWSHEET NOTE
SPIRITUAL CARE DEPARTMENT - [facility] [address]  PROGRESS NOTE    Shift date: 12.23.2020  Shift day: Wednesday   Shift # 2    Room # 2024/2024-01   Name: Greta Catherine            Age: 50 y.o. Gender: female          Adventism: Jehovah Witness   Place of Hoahaoism: None    Referral: Routine Visit    Admit Date & Time: 12/23/2020 12:43 AM    PATIENT/EVENT DESCRIPTION:  Greta Catherine is a 50 y.o. female treated for Covid-19      SPIRITUAL ASSESSMENT/INTERVENTION:  Patient is calm and coping. States that she has a cough that is starting to force up mucus. Her  is also in the hospital.  States that she is feeling better. Does not know when she is going to be discharged but is not concerned as she does not celebrate [place].  provided space for patient to express feelings, thoughts, and concerns. Determined support to be available through  and daughter. SPIRITUAL CARE FOLLOW-UP PLAN:  Chaplains will remain available to offer spiritual and emotional support as needed. Electronically signed by Bonnie Longo on 12/23/2020 at 9:54 PM.  [facility]  [phone]       12/23/20 1930   Encounter Summary   Services provided to: Patient   [facility]   Support System Spouse   Continue Visiting   ([phone])   Complexity of Encounter Moderate   Length of Encounter 15 minutes   Spiritual Assessment Completed Yes   Routine   Type Initial   Assessment Calm; Approachable;Coping   Intervention Active listening;Explored feelings, thoughts, concerns;Explored coping resources; Discussed illness/injury and it's impact   Outcome Expressed gratitude;Expressed feelings/needs/concerns;Engaged in conversation     Electronically signed by Maurilio Vasquez on 12/23/2020 at 9:54 PM

## 2020-12-25 LAB
FIBRINOGEN: 483 MG/DL (ref 140–420)
INR BLD: 1
PARTIAL THROMBOPLASTIN TIME: 28.2 SEC (ref 20.5–30.5)
PROTHROMBIN TIME: 10.4 SEC (ref 9–12)

## 2020-12-25 PROCEDURE — 2580000003 HC RX 258: Performed by: NURSE PRACTITIONER

## 2020-12-25 PROCEDURE — 2500000003 HC RX 250 WO HCPCS: Performed by: FAMILY MEDICINE

## 2020-12-25 PROCEDURE — 6370000000 HC RX 637 (ALT 250 FOR IP): Performed by: NURSE PRACTITIONER

## 2020-12-25 PROCEDURE — 36415 COLL VENOUS BLD VENIPUNCTURE: CPT

## 2020-12-25 PROCEDURE — 85730 THROMBOPLASTIN TIME PARTIAL: CPT

## 2020-12-25 PROCEDURE — 85610 PROTHROMBIN TIME: CPT

## 2020-12-25 PROCEDURE — 2580000003 HC RX 258: Performed by: FAMILY MEDICINE

## 2020-12-25 PROCEDURE — 6360000002 HC RX W HCPCS: Performed by: NURSE PRACTITIONER

## 2020-12-25 PROCEDURE — 99233 SBSQ HOSP IP/OBS HIGH 50: CPT | Performed by: INTERNAL MEDICINE

## 2020-12-25 PROCEDURE — 85384 FIBRINOGEN ACTIVITY: CPT

## 2020-12-25 PROCEDURE — 6370000000 HC RX 637 (ALT 250 FOR IP): Performed by: STUDENT IN AN ORGANIZED HEALTH CARE EDUCATION/TRAINING PROGRAM

## 2020-12-25 PROCEDURE — 1200000000 HC SEMI PRIVATE

## 2020-12-25 PROCEDURE — 6370000000 HC RX 637 (ALT 250 FOR IP): Performed by: FAMILY MEDICINE

## 2020-12-25 PROCEDURE — 99232 SBSQ HOSP IP/OBS MODERATE 35: CPT | Performed by: INTERNAL MEDICINE

## 2020-12-25 RX ADMIN — DIPHENHYDRAMINE HCL 25 MG: 25 TABLET ORAL at 00:56

## 2020-12-25 RX ADMIN — Medication 5 ML: at 13:07

## 2020-12-25 RX ADMIN — OXYCODONE HYDROCHLORIDE AND ACETAMINOPHEN 500 MG: 500 TABLET ORAL at 08:34

## 2020-12-25 RX ADMIN — REMDESIVIR 100 MG: 100 INJECTION, POWDER, LYOPHILIZED, FOR SOLUTION INTRAVENOUS at 13:07

## 2020-12-25 RX ADMIN — DEXAMETHASONE 6 MG: 4 TABLET ORAL at 08:35

## 2020-12-25 RX ADMIN — Medication 5 ML: at 08:35

## 2020-12-25 RX ADMIN — ENOXAPARIN SODIUM 40 MG: 40 INJECTION SUBCUTANEOUS at 08:35

## 2020-12-25 RX ADMIN — ZINC SULFATE 220 MG (50 MG) CAPSULE 50 MG: CAPSULE at 21:03

## 2020-12-25 RX ADMIN — Medication 6000 UNITS: at 08:36

## 2020-12-25 RX ADMIN — SODIUM CHLORIDE, PRESERVATIVE FREE 10 ML: 5 INJECTION INTRAVENOUS at 21:03

## 2020-12-25 RX ADMIN — SODIUM CHLORIDE, PRESERVATIVE FREE 10 ML: 5 INJECTION INTRAVENOUS at 08:36

## 2020-12-25 RX ADMIN — OXYCODONE HYDROCHLORIDE AND ACETAMINOPHEN 500 MG: 500 TABLET ORAL at 21:03

## 2020-12-25 RX ADMIN — ZINC SULFATE 220 MG (50 MG) CAPSULE 50 MG: CAPSULE at 08:37

## 2020-12-25 NOTE — PROGRESS NOTES
[facility]  Office: [address], DO, Hima Beary, DO, Turner Signs, DO, Meagan Carrillo Blood, DO, Celio Lott MD, Mignon Meckel, MD, Lillian Shankar MD, Priyanka Duque MD, Claudine Escamilla MD, Lizbeth Chin MD, Oksana Boyle MD, Darius Christensen MD, Angel Moy MD, Bello Mcmanus, DO, Tess Snell MD, Liliana Stiles MD, Villa Blackmon, DO, Ute Dhillon MD,  Jurgen Anaya DO, Master Mark MD, Merced Dumont MD, [facility], [facility], [facility] Max, CNP, Supriya Underwood, CNP, Wilma Bangura, CNS, Koby Britton, CNP, Marlen Luis, CNP, Horace Kessler, CNP, Anay Garcia, CNP, Kwesi Norman, CNP, Elton Louis PA-C, Pati Lux, [facility], Nay Hardwick, CNP, Sugar Chen, CNP, Aleks Hooker, CNP, Yamil Johnson, CNP, Caryle Poet, CNP         [facility]   [address] [facility]    Progress Note    12/25/2020    11:37 AM    Name:   Ld Arias  MRN:     [id number]     Acct:      [de-identified]   Room:   2024/2024-01  IP Day:  2  Admit Date:  12/23/2020 12:43 AM    PCP:   No primary care provider on file. Code Status:  Full Code    Subjective:     C/C:   Chief Complaint   Patient presents with    Shortness of Breath     tested positive for COVID 9 days ago     Interval History Status: not changed.      Patient seen and examined  Patient still complaining of cough and shortness of breath still on oxygen  Patient denies fever chest pain   I am seeing the patient for shortness of breath    Brief History:     Sharif Rowan is a 50 y.o. Non-/non  female who presents with Shortness of Breath (tested positive for COVID 9 days ago)   and is admitted to the hospital for the management of COVID-19 positive test (U07.1, COVID-19) with Acute Pneumonia and hypoxia  Patient with no known medical history except for morbid obesity, lives in [place] but here since August to assist her sick mother and help her daughter was babysitting presented to our ER with progressively worsening shortness of breath. Patient contracted Covid from her grand daughter ( was  out of town with her father for thanks giving) . Alejandro Ellsworth test was +9 days ago, the ER she had bilateral infiltrate, was hypoxic needed to 3 L and headed elevation in inflammatory markers  She was admitted for further management  Patient is a [employer] and is refusing convalescent plasma    Medications: Allergies:  No Known Allergies    Current Meds:   Scheduled Meds:    sodium chloride flush  10 mL Intravenous 2 times per day    Vitamin D  6,000 Units Oral Daily    dexamethasone  6 mg Oral Daily    remdesivir IVPB  100 mg Intravenous Q24H    enoxaparin  40 mg Subcutaneous BID    zinc sulfate  50 mg Oral BID    ascorbic acid  500 mg Oral BID     Continuous Infusions:   PRN Meds: diphenhydrAMINE, dextromethorphan-guaiFENesin, sodium chloride flush, nicotine, promethazine **OR** ondansetron, magnesium hydroxide, acetaminophen **OR** acetaminophen, sodium chloride    Data:     Past Medical History:   has no past medical history on file. Social History:   reports that she has never smoked. She has never used smokeless tobacco.     Family History:   Family History   Problem Relation Age of Onset    Diabetes Sister     Coronary Art Dis Mother        Vitals:  /80   Pulse 66   Temp 98.8 °F (37.1 °C) (Oral)   Resp 18   Ht 5' 5\" (1.651 m)   Wt 254 lb 4.5 oz (115.3 kg)   SpO2 94%   BMI 42.31 kg/m²   Temp (24hrs), Av.5 °F (36.9 °C), Min:98.1 °F (36.7 °C), Max:98.8 °F (37.1 °C)    No results for input(s): POCGLU in the last 72 hours. I/O (24Hr):   No intake or output data in the 24 hours ending 20 1137    Labs:  Hematology:  Recent Labs     20  0123 20  9669 20  4671 WBC 5.8 5.7  --    RBC 4.76 4.35  --    HGB 13.8 12.4  --    HCT 42.7 39.7  --    MCV 89.7 91.3  --    MCH 29.0 28.5  --    MCHC 32.3 31.2  --    RDW 13.4 13.5  --     219  --    MPV 10.8 10.2  --    SEDRATE 55*  --   --    CRP 33.4*  --   --    INR 0.9 0.9 1.0   DDIMER 0.57  --   --      Chemistry:  Recent Labs     12/23/20  0123 12/23/20  0312 12/24/20  0449     --  140   K 4.4  --  3.8     --  106   CO2 19*  --  23   GLUCOSE 105*  --  94   BUN 12  --  15   CREATININE 0.80  --  0.73   ANIONGAP 15  --  11   LABGLOM >60  --  >60   GFRAA >60  --  >60   CALCIUM 8.6  --  8.5*   PROBNP 24  --   --    TROPHS <6 7  --      Recent Labs     12/23/20  0123   PROT 7.3   LABALBU 3.9   LABA1C 5.2   AST 57*   ALT 43*   *   ALKPHOS 64   BILITOT 0.38     ABG:No results found for: POCPH, PHART, PH, POCPCO2, ZKB2ZAL, PCO2, POCPO2, PO2ART, PO2, POCHCO3, OQF6VWY, HCO3, NBEA, PBEA, BEART, BE, THGBART, THB, NBE6JWI, KDSN5ZSG, Y0GEMPPX, O2SAT, FIO2  No results found for: SPECIAL  No results found for: CULTURE    Radiology:  Xr Chest Portable    Result Date: 12/23/2020  Mild diffuse interstitial prominence is seen which could be related to atypical/viral pneumonia or pulmonary vascular congestion. Ct Chest Pulmonary Embolism W Contrast    Result Date: 12/23/2020  1. No acute pulmonary thromboembolic disease. Prominent main pulmonary artery suggests pulmonary hypertension. 2.  Multifocal bilateral patchy ground-glass and heterogeneous opacities. Commonly reported imaging features of COVID-19 pneumonia are present. Other processes such as influenza pneumonia and organizing pneumonia, as can be seen with drug toxicity and connective tissue disease, can cause a similar imaging pattern.        Physical Examination:        General appearance:  Alert   Lungs: Bilateral crackles  Heart:  regular rate and rhythm, no murmur Abdomen:  soft, nontender, nondistended, normal bowel sounds, no masses, hepatomegaly, splenomegaly  Extremities:  no edema, redness, tenderness in the calves  Skin:  no gross lesions, rashes, induration    Assessment:        Hospital Problems           Last Modified POA    * (Principal) Pneumonia due to COVID-19 virus 12/23/2020 Yes    Morbid obesity with BMI of 40.0-44.9, adult ([address] 12/23/2020 Yes    Hypoxia 12/23/2020 Yes    CRP elevated 12/23/2020 Yes    Patient is [employer]regational 12/23/2020 Yes          Plan:        #1 COVID-19 pneumonia  Started remdesivir on 12/23/2020  Started Decadron on 12/22  Continue vitamin supplement  Patient refused plasma  ID recommendation appreciated    #2 acute hypoxemic respiratory failure secondary to Covid pneumonia oxygen treatment as above      #3 morbid obesity diet and exercise sleep study as outpatient    Body mass index is 42.31 kg/m².     #4 abnormal LFT most likely fatty liver diet and exercise follow-up with PCP        Tamiko Su MD  12/25/2020  11:37 AM

## 2020-12-25 NOTE — PROGRESS NOTES
CXR showed bilateral infiltrates. Patient admitted because of concerns with COVID 19 and hypoxia. CURRENT EVALUATION : 12/25/2020    Afebrile  VS stable  Patient remains with SOB and cough      Patient exhibiting respiratory distress. yes  Respiratory secretions: no    Patient receiving supplemental oxygen. 3 L NC  02 sat 92  RR 32      QTc:           NEWS Score: 0-4 Low risk group; 5-6: Medium risk group; 7 or above: High risk group  Parameters 3 2 1 0 1 2 3   Age    < 65   ? 65   RR ? 8  9-11 12-20  21-24 ? 25   O2 Sats ? 91 92-93 94-95 ? 96      Suppl O2  Yes  No      SBP ? 90  101-110 111-219   ? 220   HR ? 40  41-50 51-90  111-130 ? 131   Consciousness    Alert   Drowsiness, lethargy, or confusion   Temperature ? 35.0 C (95.0 F)  35.1-36.0 C 95.1-96.9 F 36.1-38.0 C 97.0-100.4 F 38.1-39.0 C 100.5-102.3 F ? 39.1 C ? 102.4 F      NEWS Score:  ? 12-24-20: 8 high risk    Overall Daily Picture:     ? Worsening    Presence of secondary bacterial Infection:    No   Additional antibiotics: no    Labs, X rays reviewed: 12/25/2020    BUN:15  Cr:0.73    WBC:5.7  Hb:12.4  Plat: 219    Absolute Neutrophils:3.8  Absolute Lymphocytes:1.53  Neutrophil/Lymphocyte Ratio: 2.5 low risk    CRP: 33.4  Ferritin:248  LDH: 439    Pro Calcitonin:      Cultures:  Urine:  ·   Blood:  ·   Sputum :  ·   Wound:  ? CXR:   ·   CAT:      Discussed with patient, RN, CC, IM. I have personally reviewed the past medical history, past surgical history, medications, social history, and family history, and I have updated the database accordingly. Past Medical History:   History reviewed. No pertinent past medical history. Past Surgical  History:   History reviewed. No pertinent surgical history.     Medications:      sodium chloride flush  10 mL Intravenous 2 times per day    Vitamin D  6,000 Units Oral Daily    dexamethasone  6 mg Oral Daily    remdesivir IVPB  100 mg Intravenous Q24H  enoxaparin  40 mg Subcutaneous BID    zinc sulfate  50 mg Oral BID    ascorbic acid  500 mg Oral BID       Social History:     Social History     Socioeconomic History    Marital status:      Spouse name: Not on file    Number of children: Not on file    Years of education: Not on file    Highest education level: Not on file   Occupational History    Not on file   Social Needs    Financial resource strain: Not on file    Food insecurity     Worry: Not on file     Inability: Not on file    Transportation needs     Medical: Not on file     Non-medical: Not on file   Tobacco Use    Smoking status: Never Smoker    Smokeless tobacco: Never Used   Substance and Sexual Activity    Alcohol use: Not on file    Drug use: Not on file    Sexual activity: Not on file   Lifestyle    Physical activity     Days per week: Not on file     Minutes per session: Not on file    Stress: Not on file   Relationships    Social connections     Talks on phone: Not on file     Gets together: Not on file     Attends Sikh service: Not on file     Active member of club or organization: Not on file     Attends meetings of clubs or organizations: Not on file     Relationship status: Not on file    Intimate partner violence     Fear of current or ex partner: Not on file     Emotionally abused: Not on file     Physically abused: Not on file     Forced sexual activity: Not on file   Other Topics Concern    Not on file   Social History Narrative    Not on file       Family History:     Family History   Problem Relation Age of Onset    Diabetes Sister     Coronary Art Dis Mother         Allergies:   Patient has no known allergies. Review of Systems:       Constitutional: No fevers or chills. No systemic complaints  Head: No headaches  Eyes: No double vision or blurry vision. No conjunctival inflammation. ENT: No sore throat or runny nose. . No hearing loss, tinnitus or vertigo. Cardiovascular: No chest pain or palpitations. Shortness of breath. SANTOS  Lung: Shortness of breath or cough. No sputum production  Abdomen: No nausea, vomiting, diarrhea, or abdominal pain. Clenton Reas No cramps. Genitourinary: No increased urinary frequency, or dysuria. No hematuria. No suprapubic or CVA pain  Musculoskeletal: No muscle aches or pains. No joint effusions, swelling or deformities  Hematologic: No bleeding or bruising. Neurologic: No headache, weakness, numbness, or tingling. Integument: No rash, no ulcers. Psychiatric: No depression. Endocrine: No polyuria, no polydipsia, no polyphagia. Physical Examination :     Patient Vitals for the past 8 hrs:   BP Temp Temp src Pulse Resp SpO2   12/25/20 1307 139/88 97.9 °F (36.6 °C) Oral 76 20 96 %   12/25/20 0824 133/80 98.8 °F (37.1 °C) Oral 66 18 94 %     General Appearance: Awake, alert, and in mild apparent distress. Heavy set  Head:  Normocephalic, no trauma  Eyes: Pupils equal, round, reactive to light; sclera anicteric; conjunctivae pink. No embolic phenomena. ENT: Oropharynx clear, without erythema, exudate, or thrush. No tenderness of sinuses. Mouth/throat: mucosa pink and moist. No lesions. Dentition in good repair. Neck:Supple, without lymphadenopathy. Thyroid normal, No bruits. Pulmonary/Chest: Decreased breath sounds, distant sounds. Cardiovascular: Regular rate and rhythm without murmurs, rubs, or gallops. Abdomen: Soft, non tender. Bowel sounds normal. No organomegaly  All four Extremities: No cyanosis, clubbing, edema, or effusions. Neurologic: No gross sensory or motor deficits. Skin: Warm and dry with good turgor. No signs of peripheral arterial or venous insufficiency. No ulcerations. No open wounds.     Medical Decision Making -Laboratory:   I have independently reviewed/ordered the following labs:    CBC with Differential:   Recent Labs     12/23/20  0123 12/24/20  0449   WBC 5.8 5.7   HGB 13.8 12.4   HCT 42.7 39.7    219 LYMPHOPCT 26  --    MONOPCT 7  --      BMP:   Recent Labs     12/23/20  0123 12/24/20  0449    140   K 4.4 3.8    106   CO2 19* 23   BUN 12 15   CREATININE 0.80 0.73     Hepatic Function Panel:   Recent Labs     12/23/20  0123   PROT 7.3   LABALBU 3.9   BILITOT 0.38   ALKPHOS 64   ALT 43*   AST 57*     No results for input(s): RPR in the last 72 hours. No results for input(s): HIV in the last 72 hours. No results for input(s): BC in the last 72 hours. Lab Results   Component Value Date    MUCUS NOT REPORTED 12/23/2020    RBC 4.35 12/24/2020    TRICHOMONAS NOT REPORTED 12/23/2020    WBC 5.7 12/24/2020    YEAST NOT REPORTED 12/23/2020    TURBIDITY CLEAR 12/23/2020     Lab Results   Component Value Date    CREATININE 0.73 12/24/2020    GLUCOSE 94 12/24/2020       Medical Decision Making-Imaging:     EXAMINATION:   CTA OF THE CHEST 12/23/2020 2:57 am       TECHNIQUE:   CTA of the chest was performed after the administration of intravenous   contrast.  Multiplanar reformatted images are provided for review.  MIP   images are provided for review.  Dose modulation, iterative reconstruction,   and/or weight based adjustment of the mA/kV was utilized to reduce the   radiation dose to as low as reasonably achievable.       COMPARISON:   None.       HISTORY:   ORDERING SYSTEM PROVIDED HISTORY: r/o PE   TECHNOLOGIST PROVIDED HISTORY:   r/o PE   Reason for Exam: r/o PE   Acuity: Acute   Type of Exam: Initial       FINDINGS:   Pulmonary Arteries: No acute pulmonary thromboembolic disease.  Prominent   main pulmonary artery measures 3.5 cm in diameter suggesting pulmonary   hypertension.  No right ventricular strain.       Mediastinum: No evidence of mediastinal lymphadenopathy.  The heart and   pericardium demonstrate no acute abnormality.  There is no acute abnormality   of the thoracic aorta.       Lungs/pleura: Respiratory motion.  Multifocal bilateral patchy ground-glass and heterogeneous opacities.  No lobar consolidation.  No suspicious   pulmonary mass.  No endoluminal lesion.  No pleural effusion or pneumothorax.       Upper Abdomen: Limited images of the upper abdomen are unremarkable.       Soft Tissues/Bones: No acute bone or soft tissue abnormality.           Impression   1.  No acute pulmonary thromboembolic disease.  Prominent main pulmonary   artery suggests pulmonary hypertension.       2.  Multifocal bilateral patchy ground-glass and heterogeneous opacities. Commonly reported imaging features of COVID-19 pneumonia are present.  Other   processes such as influenza pneumonia and organizing pneumonia, as can be   seen with drug toxicity and connective tissue disease, can cause a similar   imaging pattern.             EXAMINATION:   ONE XRAY VIEW OF THE CHEST       12/23/2020 1:19 am       COMPARISON:   None.       HISTORY:   ORDERING SYSTEM PROVIDED HISTORY: COugh SOB, covid +   TECHNOLOGIST PROVIDED HISTORY:   COugh SOB, covid +   Reason for Exam: cough, shortness of breath   upright port       FINDINGS:   The cardiomediastinal silhouette is within normal limits.  Diffuse   interstitial prominence is noted.  There is no pleural effusion or   pneumothorax.           Impression   Mild diffuse interstitial prominence is seen which could be related to   atypical/viral pneumonia or pulmonary vascular congestion. Medical Decision Mhqwza-Synyhkbj-Bvnqb:       Medical Decision Making-Other:     Note:  · Labs, medications, radiologic studies were reviewed with personal review of films  · Large amounts of data were reviewed  · Discussed with nursing Staff, Discharge planner  · Infection Control and Prevention measures reviewed  · All prior entries were reviewed  · Administer medications as ordered  · Prognosis: Guarded  · Discharge planning reviewed  · Follow up as outpatient.

## 2020-12-26 LAB
FIBRINOGEN: 448 MG/DL (ref 140–420)
INR BLD: 0.9
PARTIAL THROMBOPLASTIN TIME: 25.7 SEC (ref 20.5–30.5)
PROTHROMBIN TIME: 9.9 SEC (ref 9–12)

## 2020-12-26 PROCEDURE — 94761 N-INVAS EAR/PLS OXIMETRY MLT: CPT

## 2020-12-26 PROCEDURE — 99232 SBSQ HOSP IP/OBS MODERATE 35: CPT | Performed by: INTERNAL MEDICINE

## 2020-12-26 PROCEDURE — 1200000000 HC SEMI PRIVATE

## 2020-12-26 PROCEDURE — 6370000000 HC RX 637 (ALT 250 FOR IP): Performed by: NURSE PRACTITIONER

## 2020-12-26 PROCEDURE — 6370000000 HC RX 637 (ALT 250 FOR IP): Performed by: FAMILY MEDICINE

## 2020-12-26 PROCEDURE — APPSS30 APP SPLIT SHARED TIME 16-30 MINUTES: Performed by: NURSE PRACTITIONER

## 2020-12-26 PROCEDURE — 2580000003 HC RX 258: Performed by: NURSE PRACTITIONER

## 2020-12-26 PROCEDURE — 6360000002 HC RX W HCPCS: Performed by: NURSE PRACTITIONER

## 2020-12-26 PROCEDURE — 2700000000 HC OXYGEN THERAPY PER DAY

## 2020-12-26 PROCEDURE — 2500000003 HC RX 250 WO HCPCS: Performed by: FAMILY MEDICINE

## 2020-12-26 PROCEDURE — 36415 COLL VENOUS BLD VENIPUNCTURE: CPT

## 2020-12-26 PROCEDURE — 6370000000 HC RX 637 (ALT 250 FOR IP): Performed by: STUDENT IN AN ORGANIZED HEALTH CARE EDUCATION/TRAINING PROGRAM

## 2020-12-26 PROCEDURE — 85730 THROMBOPLASTIN TIME PARTIAL: CPT

## 2020-12-26 PROCEDURE — 2580000003 HC RX 258: Performed by: FAMILY MEDICINE

## 2020-12-26 PROCEDURE — 85384 FIBRINOGEN ACTIVITY: CPT

## 2020-12-26 PROCEDURE — 85610 PROTHROMBIN TIME: CPT

## 2020-12-26 RX ADMIN — ZINC SULFATE 220 MG (50 MG) CAPSULE 50 MG: CAPSULE at 21:39

## 2020-12-26 RX ADMIN — Medication 5 ML: at 09:12

## 2020-12-26 RX ADMIN — DEXAMETHASONE 6 MG: 4 TABLET ORAL at 09:11

## 2020-12-26 RX ADMIN — SODIUM CHLORIDE, PRESERVATIVE FREE 10 ML: 5 INJECTION INTRAVENOUS at 21:39

## 2020-12-26 RX ADMIN — ZINC SULFATE 220 MG (50 MG) CAPSULE 50 MG: CAPSULE at 09:11

## 2020-12-26 RX ADMIN — OXYCODONE HYDROCHLORIDE AND ACETAMINOPHEN 500 MG: 500 TABLET ORAL at 09:11

## 2020-12-26 RX ADMIN — Medication 6000 UNITS: at 09:11

## 2020-12-26 RX ADMIN — REMDESIVIR 100 MG: 100 INJECTION, POWDER, LYOPHILIZED, FOR SOLUTION INTRAVENOUS at 13:23

## 2020-12-26 RX ADMIN — OXYCODONE HYDROCHLORIDE AND ACETAMINOPHEN 500 MG: 500 TABLET ORAL at 21:39

## 2020-12-26 RX ADMIN — SODIUM CHLORIDE, PRESERVATIVE FREE 10 ML: 5 INJECTION INTRAVENOUS at 09:12

## 2020-12-26 RX ADMIN — Medication 5 ML: at 02:30

## 2020-12-26 RX ADMIN — ENOXAPARIN SODIUM 40 MG: 40 INJECTION SUBCUTANEOUS at 09:11

## 2020-12-26 NOTE — PROGRESS NOTES
History reviewed. No pertinent surgical history. Medications:      sodium chloride flush  10 mL Intravenous 2 times per day    Vitamin D  6,000 Units Oral Daily    dexamethasone  6 mg Oral Daily    remdesivir IVPB  100 mg Intravenous Q24H    enoxaparin  40 mg Subcutaneous BID    zinc sulfate  50 mg Oral BID    ascorbic acid  500 mg Oral BID       Social History:     Social History     Socioeconomic History    Marital status:      Spouse name: Not on file    Number of children: Not on file    Years of education: Not on file    Highest education level: Not on file   Occupational History    Not on file   Social Needs    Financial resource strain: Not on file    Food insecurity     Worry: Not on file     Inability: Not on file    Transportation needs     Medical: Not on file     Non-medical: Not on file   Tobacco Use    Smoking status: Never Smoker    Smokeless tobacco: Never Used   Substance and Sexual Activity    Alcohol use: Not on file    Drug use: Not on file    Sexual activity: Not on file   Lifestyle    Physical activity     Days per week: Not on file     Minutes per session: Not on file    Stress: Not on file   Relationships    Social connections     Talks on phone: Not on file     Gets together: Not on file     Attends Gnosticism service: Not on file     Active member of club or organization: Not on file     Attends meetings of clubs or organizations: Not on file     Relationship status: Not on file    Intimate partner violence     Fear of current or ex partner: Not on file     Emotionally abused: Not on file     Physically abused: Not on file     Forced sexual activity: Not on file   Other Topics Concern    Not on file   Social History Narrative    Not on file       Family History:     Family History   Problem Relation Age of Onset    Diabetes Sister     Coronary Art Dis Mother         Allergies:   Patient has no known allergies.      Review of Systems: Patient assessed 12/26/2020    Constitutional: No fevers or chills. No systemic complaints  Head: No headaches  Eyes: No double vision or blurry vision. No conjunctival inflammation. ENT: No sore throat or runny nose. . No hearing loss, tinnitus or vertigo. Cardiovascular: No chest pain or palpitations. No Shortness of breath. No SANTOS  Lung: No Shortness of breath or cough. No sputum production  Abdomen: No nausea, vomiting, diarrhea, or abdominal pain. Shana Money No cramps. Genitourinary: No increased urinary frequency, or dysuria. No hematuria. No suprapubic or CVA pain  Musculoskeletal: No muscle aches or pains. No joint effusions, swelling or deformities  Hematologic: No bleeding or bruising. Neurologic: No headache, weakness, numbness, or tingling. Integument: No rash, no ulcers. Psychiatric: No depression. Endocrine: No polyuria, no polydipsia, no polyphagia. Physical Examination :   No data found. General Appearance: Awake, alert, and in mild apparent distress. Heavy set  Head:  Normocephalic, no trauma  Eyes: Pupils equal, round, reactive to light; sclera anicteric; conjunctivae pink. No embolic phenomena. ENT: Oropharynx clear, without erythema, exudate, or thrush. No tenderness of sinuses. Mouth/throat: mucosa pink and moist. No lesions. Dentition in good repair. Neck:Supple, without lymphadenopathy. Thyroid normal, No bruits. Pulmonary/Chest: Decreased breath sounds, distant sounds. Cardiovascular: Regular rate and rhythm without murmurs, rubs, or gallops. Abdomen: Soft, non tender. Bowel sounds normal. No organomegaly  All four Extremities: No cyanosis, clubbing, edema, or effusions. Neurologic: No gross sensory or motor deficits. Skin: Warm and dry with good turgor. No signs of peripheral arterial or venous insufficiency. No ulcerations. No open wounds.     Medical Decision Making -Laboratory:   I have independently reviewed/ordered the following labs:    CBC with Differential:   Recent Labs 12/24/20  0449   WBC 5.7   HGB 12.4   HCT 39.7        BMP:   Recent Labs     12/24/20  0449      K 3.8      CO2 23   BUN 15   CREATININE 0.73     Hepatic Function Panel: No results for input(s): PROT, LABALBU, BILIDIR, IBILI, BILITOT, ALKPHOS, ALT, AST in the last 72 hours. No results for input(s): RPR in the last 72 hours. No results for input(s): HIV in the last 72 hours. No results for input(s): BC in the last 72 hours. Lab Results   Component Value Date    MUCUS NOT REPORTED 12/23/2020    RBC 4.35 12/24/2020    TRICHOMONAS NOT REPORTED 12/23/2020    WBC 5.7 12/24/2020    YEAST NOT REPORTED 12/23/2020    TURBIDITY CLEAR 12/23/2020     Lab Results   Component Value Date    CREATININE 0.73 12/24/2020    GLUCOSE 94 12/24/2020       Medical Decision Making-Imaging:      EXAMINATION:   CTA OF THE CHEST 12/23/2020 2:57 am       TECHNIQUE:   CTA of the chest was performed after the administration of intravenous   contrast.  Multiplanar reformatted images are provided for review.  MIP   images are provided for review.  Dose modulation, iterative reconstruction,   and/or weight based adjustment of the mA/kV was utilized to reduce the   radiation dose to as low as reasonably achievable.       COMPARISON:   None.       HISTORY:   ORDERING SYSTEM PROVIDED HISTORY: r/o PE   TECHNOLOGIST PROVIDED HISTORY:   r/o PE   Reason for Exam: r/o PE   Acuity: Acute   Type of Exam: Initial       FINDINGS:   Pulmonary Arteries: No acute pulmonary thromboembolic disease.  Prominent   main pulmonary artery measures 3.5 cm in diameter suggesting pulmonary   hypertension.  No right ventricular strain.       Mediastinum: No evidence of mediastinal lymphadenopathy.  The heart and   pericardium demonstrate no acute abnormality.  There is no acute abnormality   of the thoracic aorta.       Lungs/pleura: Respiratory motion.  Multifocal bilateral patchy ground-glass and heterogeneous opacities.  No lobar consolidation.  No suspicious   pulmonary mass.  No endoluminal lesion.  No pleural effusion or pneumothorax.       Upper Abdomen: Limited images of the upper abdomen are unremarkable.       Soft Tissues/Bones: No acute bone or soft tissue abnormality.           Impression   1.  No acute pulmonary thromboembolic disease.  Prominent main pulmonary   artery suggests pulmonary hypertension.       2.  Multifocal bilateral patchy ground-glass and heterogeneous opacities. Commonly reported imaging features of COVID-19 pneumonia are present.  Other   processes such as influenza pneumonia and organizing pneumonia, as can be   seen with drug toxicity and connective tissue disease, can cause a similar   imaging pattern.              EXAMINATION:   ONE XRAY VIEW OF THE CHEST       12/23/2020 1:19 am       COMPARISON:   None.       HISTORY:   ORDERING SYSTEM PROVIDED HISTORY: COugh SOB, covid +   TECHNOLOGIST PROVIDED HISTORY:   COugh SOB, covid +   Reason for Exam: cough, shortness of breath   upright port       FINDINGS:   The cardiomediastinal silhouette is within normal limits.  Diffuse   interstitial prominence is noted.  There is no pleural effusion or   pneumothorax.           Impression   Mild diffuse interstitial prominence is seen which could be related to   atypical/viral pneumonia or pulmonary vascular congestion.          Medical Decision Kajohl-Fihaobnm-Ykero:       Medical Decision Making-Other:     Note:  · Labs, medications, radiologic studies were reviewed with personal review of films  · Moderate Large amounts of data were reviewed  · Discussed with nursing Staff, Discharge planner  · Infection Control and Prevention measures reviewed  · All prior entries were reviewed  · Administer medications as ordered  · Prognosis: Guarded  · Discharge planning reviewed  · Follow up as outpatient. Thank you for allowing us to participate in the care of this patient. Please call with questions. WANDER Simpson - CNP     ATTESTATION:    I have discussed the case, including pertinent history and exam findings with the APRN. I have evaluated the  History, physical findings and pictures of the patient and the key elements of the encounter have been performed by me. I have reviewed the laboratory data, other diagnostic studies and discussed them with the APRN. I have updated the medical record where necessary. I agree with the assessment, plan and orders as documented by the APRN.     Meli Baumann MD.      Pager: [phone] - Office: [phone]

## 2020-12-26 NOTE — PROGRESS NOTES
Writer tried to call daughter Mai Cool, went straight to voice mail and no option to leave a message since voice mail box was full  Will let oncoming nurse know.    Will continue to monitor and assess patient

## 2020-12-26 NOTE — PROGRESS NOTES
[facility]  Office: [address], DO, Leigh Espinosa, DO, Jessica Borja, DO, Megan Leon Blood, DO, Rachael Hernandez MD, Anisha Castañeda MD, Citlalli Costello MD, Stacy Dean MD, Jiles Alpers, MD, Tati Davidson MD, Gianni Villagomez MD, Chavez Fontaine MD, Angel Sanchez MD, Suyapa Lopez, DO, Harris Mendoza MD, Kike Rachel MD, Phan Fountain, DO, Karlene Eldridge MD,  aKren Linares DO, Rene Martinez MD, Suzy Yarbrough MD, Judy Goldberg, [facility], [facility], [facility], [facility], CNP, Aminta Rosenbaum, [facility], [facility], [facility], [facility], CNP, [place] M[place], CNP, Jonatan Garner, CNP, Fahad Rahman, CNP, Vikram Pan PA-C, Shivani Garcia [facility], Ivet Forbes, [facility], [facility], CNP, Laurence Trevizo, CNP, Eliseo Sims, CNP, Jevon Crews, [address] [place] [address]    Progress Note    12/26/2020    11:10 AM    Name:   Logan Zhu  MRN:     [id number]     Acct:      [de-identified]   Room:   2024/2024-01  IP Day:  3  Admit Date:  12/23/2020 12:43 AM    PCP:   No primary care provider on file. Code Status:  Full Code    Subjective:     C/C:   Chief Complaint   Patient presents with    Shortness of Breath     tested positive for COVID 9 days ago     Interval History Status: not changed. Patient seen and examined  Patient still complaining of cough and shortness of breath still on oxygen requiring 1 L of oxygen  Last dose of remdesivir anticipate discharge in a.m.   Patient denies fever chest pain   I am seeing the patient for shortness of breath    Brief History:     Devon Rowan is a 50 y.o. Non-/non  female who presents with Shortness of Breath (tested positive for COVID 9 days ago)   and is admitted to the hospital for the management of COVID-19 positive test (U07.1, COVID-19) with Acute Pneumonia and hypoxia  Patient with no known medical history except for morbid obesity, lives in [place] but here since August to assist her sick mother and help her daughter was babysitting presented to our ER with progressively worsening shortness of breath. Patient contracted Covid from her grand daughter ( was  out of town with her father for thanks giving) . Paxton Armstrong test was +9 days ago, the ER she had bilateral infiltrate, was hypoxic needed to 3 L and headed elevation in inflammatory markers  She was admitted for further management  Patient is a Yaz[employer] and is refusing convalescent plasma    Medications: Allergies:  No Known Allergies    Current Meds:   Scheduled Meds:    sodium chloride flush  10 mL Intravenous 2 times per day    Vitamin D  6,000 Units Oral Daily    dexamethasone  6 mg Oral Daily    remdesivir IVPB  100 mg Intravenous Q24H    enoxaparin  40 mg Subcutaneous BID    zinc sulfate  50 mg Oral BID    ascorbic acid  500 mg Oral BID     Continuous Infusions:   PRN Meds: diphenhydrAMINE, dextromethorphan-guaiFENesin, sodium chloride flush, nicotine, promethazine **OR** ondansetron, magnesium hydroxide, acetaminophen **OR** acetaminophen, sodium chloride    Data:     Past Medical History:   has no past medical history on file. Social History:   reports that she has never smoked. She has never used smokeless tobacco.     Family History:   Family History   Problem Relation Age of Onset    Diabetes Sister     Coronary Art Dis Mother        Vitals:  BP (!) 151/80   Pulse 79   Temp 97.5 °F (36.4 °C) (Oral)   Resp 18   Ht 5' 5\" (1.651 m)   Wt 251 lb 8 oz (114.1 kg)   SpO2 94%   BMI 41.85 kg/m²   Temp (24hrs), Av.9 °F (36.6 °C), Min:97.5 °F (36.4 °C), Max:98.4 °F (36.9 °C)    No results for input(s): POCGLU in the last 72 hours. I/O (24Hr):     Intake/Output Summary (Last 24 hours) at 2020 1110  Last data filed at 2020 0619  Gross per 24 hour   Intake 600 ml   Output  Net 600 ml       Labs:  Hematology:  Recent Labs     12/24/20 0449 12/25/20  0631 12/26/20  0726   WBC 5.7  --   --    RBC 4.35  --   --    HGB 12.4  --   --    HCT 39.7  --   --    MCV 91.3  --   --    MCH 28.5  --   --    MCHC 31.2  --   --    RDW 13.5  --   --      --   --    MPV 10.2  --   --    INR 0.9 1.0 0.9     Chemistry:  Recent Labs     12/24/20  0449      K 3.8      CO2 23   GLUCOSE 94   BUN 15   CREATININE 0.73   ANIONGAP 11   LABGLOM >60   GFRAA >60   CALCIUM 8.5*     No results for input(s): PROT, LABALBU, LABA1C, V1DCWDM, J0ADUHA, FT4, TSH, AST, ALT, LDH, GGT, ALKPHOS, LABGGT, BILITOT, BILIDIR, AMMONIA, AMYLASE, LIPASE, LACTATE, CHOL, HDL, LDLCHOLESTEROL, CHOLHDLRATIO, TRIG, VLDL, BJP87SO, PHENYTOIN, PHENYF, URICACID, POCGLU in the last 72 hours. ABG:No results found for: POCPH, PHART, PH, POCPCO2, OBQ7IAS, PCO2, POCPO2, PO2ART, PO2, POCHCO3, KJX9USD, HCO3, NBEA, PBEA, BEART, BE, THGBART, THB, LDM3QMF, WFNR5VMV, S2BKXQAN, O2SAT, FIO2  No results found for: SPECIAL  No results found for: CULTURE    Radiology:  Xr Chest Portable    Result Date: 12/23/2020  Mild diffuse interstitial prominence is seen which could be related to atypical/viral pneumonia or pulmonary vascular congestion. Ct Chest Pulmonary Embolism W Contrast    Result Date: 12/23/2020  1. No acute pulmonary thromboembolic disease. Prominent main pulmonary artery suggests pulmonary hypertension. 2.  Multifocal bilateral patchy ground-glass and heterogeneous opacities. Commonly reported imaging features of COVID-19 pneumonia are present. Other processes such as influenza pneumonia and organizing pneumonia, as can be seen with drug toxicity and connective tissue disease, can cause a similar imaging pattern.        Physical Examination:        General appearance:  Alert   Lungs: Bilateral crackles  Heart:  regular rate and rhythm, no murmur

## 2020-12-27 VITALS
OXYGEN SATURATION: 95 % | RESPIRATION RATE: 18 BRPM | DIASTOLIC BLOOD PRESSURE: 82 MMHG | SYSTOLIC BLOOD PRESSURE: 149 MMHG | TEMPERATURE: 98.2 F | HEIGHT: 65 IN | WEIGHT: 252.5 LBS | HEART RATE: 73 BPM | BODY MASS INDEX: 42.07 KG/M2

## 2020-12-27 LAB
FIBRINOGEN: 431 MG/DL (ref 140–420)
INR BLD: 1
PARTIAL THROMBOPLASTIN TIME: 25.6 SEC (ref 20.5–30.5)
PROTHROMBIN TIME: 10 SEC (ref 9–12)

## 2020-12-27 PROCEDURE — 36415 COLL VENOUS BLD VENIPUNCTURE: CPT

## 2020-12-27 PROCEDURE — 2500000003 HC RX 250 WO HCPCS: Performed by: FAMILY MEDICINE

## 2020-12-27 PROCEDURE — 94618 PULMONARY STRESS TESTING: CPT

## 2020-12-27 PROCEDURE — 2580000003 HC RX 258: Performed by: NURSE PRACTITIONER

## 2020-12-27 PROCEDURE — 6370000000 HC RX 637 (ALT 250 FOR IP): Performed by: FAMILY MEDICINE

## 2020-12-27 PROCEDURE — 6370000000 HC RX 637 (ALT 250 FOR IP): Performed by: NURSE PRACTITIONER

## 2020-12-27 PROCEDURE — 99239 HOSP IP/OBS DSCHRG MGMT >30: CPT | Performed by: INTERNAL MEDICINE

## 2020-12-27 PROCEDURE — 2580000003 HC RX 258: Performed by: FAMILY MEDICINE

## 2020-12-27 PROCEDURE — 85730 THROMBOPLASTIN TIME PARTIAL: CPT

## 2020-12-27 PROCEDURE — 85384 FIBRINOGEN ACTIVITY: CPT

## 2020-12-27 PROCEDURE — 6360000002 HC RX W HCPCS: Performed by: NURSE PRACTITIONER

## 2020-12-27 PROCEDURE — 94761 N-INVAS EAR/PLS OXIMETRY MLT: CPT

## 2020-12-27 PROCEDURE — 85610 PROTHROMBIN TIME: CPT

## 2020-12-27 RX ORDER — ZINC SULFATE 50(220)MG
50 CAPSULE ORAL 2 TIMES DAILY
Qty: 30 CAPSULE | Refills: 0 | COMMUNITY
Start: 2020-12-27

## 2020-12-27 RX ORDER — CHOLECALCIFEROL (VITAMIN D3) 50 MCG
2000 TABLET ORAL DAILY
Qty: 30 TABLET | Refills: 0 | Status: SHIPPED | OUTPATIENT
Start: 2020-12-28

## 2020-12-27 RX ORDER — ASCORBIC ACID 500 MG
500 TABLET ORAL 2 TIMES DAILY
Qty: 60 TABLET | Refills: 0 | Status: SHIPPED | OUTPATIENT
Start: 2020-12-27

## 2020-12-27 RX ADMIN — REMDESIVIR 100 MG: 100 INJECTION, POWDER, LYOPHILIZED, FOR SOLUTION INTRAVENOUS at 11:28

## 2020-12-27 RX ADMIN — DEXAMETHASONE 6 MG: 4 TABLET ORAL at 09:00

## 2020-12-27 RX ADMIN — OXYCODONE HYDROCHLORIDE AND ACETAMINOPHEN 500 MG: 500 TABLET ORAL at 09:00

## 2020-12-27 RX ADMIN — Medication 6000 UNITS: at 09:00

## 2020-12-27 RX ADMIN — ZINC SULFATE 220 MG (50 MG) CAPSULE 50 MG: CAPSULE at 09:00

## 2020-12-27 RX ADMIN — SODIUM CHLORIDE, PRESERVATIVE FREE 10 ML: 5 INJECTION INTRAVENOUS at 09:01

## 2020-12-27 ASSESSMENT — PAIN SCALES - GENERAL
PAINLEVEL_OUTOF10: 0
PAINLEVEL_OUTOF10: 0

## 2020-12-27 NOTE — PROGRESS NOTES
Home Oxygen Evaluation    Home Oxygen Evaluation completed. Patient is on room air at rest.   Resting SpO2 = 96%  Resting SpO2 on room air = 96%    SpO2 on room air with exercise = 94%  SpO2 on oxygen as above with exercise = NA    Nocturnal Oximetry with patient on room air is recommended is SpO2 is between 89% and 95% (requires additional order).     Gabriella Josue  11:25 AM

## 2020-12-27 NOTE — PROGRESS NOTES
Writer asked patient if she wanted writer to update daughter this morning, patient said she would update family herself. All questions answered no concerns at this time. Will continue to monitor and assess patient.

## 2020-12-27 NOTE — PLAN OF CARE
Problem: Airway Clearance - Ineffective  Goal: Achieve or maintain patent airway  12/27/2020 1509 by Ag Pruitt RN  Outcome: Ongoing  12/27/2020 0402 by Isabella Gould RN  Outcome: Ongoing     Problem: Gas Exchange - Impaired  Goal: Absence of hypoxia  12/27/2020 1509 by Ag Pruitt RN  Outcome: Ongoing  12/27/2020 0402 by Isabella Gould RN  Outcome: Ongoing  Goal: Promote optimal lung function  12/27/2020 1509 by Ag Pruitt RN  Outcome: Ongoing  12/27/2020 0402 by Isablela Gould RN  Outcome: Ongoing     Problem: Breathing Pattern - Ineffective  Goal: Ability to achieve and maintain a regular respiratory rate  12/27/2020 1509 by Ag Pruitt RN  Outcome: Ongoing  12/27/2020 0402 by Isabella Gould RN  Outcome: Ongoing     Problem:  Body Temperature -  Risk of, Imbalanced  Goal: Ability to maintain a body temperature within defined limits  12/27/2020 1509 by Ag Pruitt RN  Outcome: Ongoing  12/27/2020 0402 by Isabella Gould RN  Outcome: Ongoing  Goal: Will regain or maintain usual level of consciousness  12/27/2020 1509 by Ag Pruitt RN  Outcome: Ongoing  12/27/2020 0402 by Isabella Gould RN  Outcome: Ongoing  Goal: Complications related to the disease process, condition or treatment will be avoided or minimized  12/27/2020 1509 by Ag Pruitt RN  Outcome: Ongoing  12/27/2020 0402 by Isabella Gould RN  Outcome: Ongoing     Problem: Isolation Precautions - Risk of Spread of Infection  Goal: Prevent transmission of infection  12/27/2020 1509 by Ag Pruitt RN  Outcome: Ongoing  12/27/2020 0402 by Isabella Gould RN  Outcome: Ongoing     Problem: Nutrition Deficits  Goal: Optimize nutrtional status  12/27/2020 1509 by Ag Pruitt RN  Outcome: Ongoing  12/27/2020 0402 by Isabella Gould RN  Outcome: Ongoing     Problem: Risk for Fluid Volume Deficit  Goal: Maintain normal heart rhythm  12/27/2020 1509 by Ag Pruitt RN  Outcome: Ongoing  12/27/2020 0402 by Isabella Gould RN  Outcome: Ongoing Goal: Maintain absence of muscle cramping  12/27/2020 1509 by Ramses Paige RN  Outcome: Ongoing  12/27/2020 0402 by Shannan Silvestre RN  Outcome: Ongoing  Goal: Maintain normal serum potassium, sodium, calcium, phosphorus, and pH  12/27/2020 1509 by Ramses Paige RN  Outcome: Ongoing  12/27/2020 0402 by Shannan Silvestre RN  Outcome: Ongoing     Problem: Loneliness or Risk for Loneliness  Goal: Demonstrate positive use of time alone when socialization is not possible  12/27/2020 1509 by Ramses Paige RN  Outcome: Ongoing  12/27/2020 0402 by Shannan Silvestre RN  Outcome: Ongoing     Problem: Fatigue  Goal: Verbalize increase energy and improved vitality  12/27/2020 1509 by Ramses Paige RN  Outcome: Ongoing  12/27/2020 0402 by Shannan Silvestre RN  Outcome: Ongoing     Problem: Patient Education: Go to Patient Education Activity  Goal: Patient/Family Education  12/27/2020 1509 by Ramses Paige RN  Outcome: Ongoing  12/27/2020 0402 by Shannan Silvestre RN  Outcome: Ongoing     Problem: Pain:  Goal: Pain level will decrease  Description: Pain level will decrease  12/27/2020 1509 by Ramses Paige RN  Outcome: Ongoing  12/27/2020 0402 by Shannan Silvestre RN  Outcome: Ongoing  Goal: Control of acute pain  Description: Control of acute pain  12/27/2020 1509 by Ramses Paige RN  Outcome: Ongoing  12/27/2020 0402 by Shannan Silvestre RN  Outcome: Ongoing  Goal: Control of chronic pain  Description: Control of chronic pain  12/27/2020 1509 by Ramses Paige RN  Outcome: Ongoing  12/27/2020 0402 by Shannan Silvestre RN  Outcome: Ongoing

## 2020-12-27 NOTE — DISCHARGE SUMMARY
[facility]  Office: [address], DO, Lastjihan Arcos, DO, Dawsonsarah Duncan, DO, Garett Perez Blood, DO, Jaye Nam MD, Vernon Dang MD, Elbert Sifuentse MD, Cindy Raymundo MD, Joanna Flores MD, India Quintanilla MD, Fabrice Burnett MD, Vi Camacho MD, Angel Mcintosh MD, Fortino Kinney DO, Laureano Ball MD, Nyoka Hodgkins, MD, Katharine Hairston DO, Jadene Boast, MD,  Arianna Toro DO, Ethan Gaspar MD, Mary Resendiz MD, Cindy Guerra [facility], [facility], CNP, Winsome Roque, CNP, Mitul Shea, CNS, Verenice Trujilol, CNP, Violette Joseph, CNP, Kami Foot, CNP, Emy Gamma, CNP, Vianney Losedonnell, CNP, Mini Odom PA-C, Lilian Henao [facility], Man Cabrera, CNP, Kylee Quijano, CNP, Yin Urbano, CNP, Amina Meyer, CNP, Cherry Grace, [facility]   [address] [facility]    Discharge Summary     Patient ID: Amadeo Hammond  :  1965   MRN: [id number]     ACCOUNT:  [de-identified]   Patient's PCP: No primary care provider on file. Admit Date: 2020   Discharge Date: 2020     Length of Stay: 4  Code Status:  Full Code  Admitting Physician: Laureano Ball MD  Discharge Physician: Laureano Ball MD     Active Discharge Diagnoses:     Hospital Problem Lists:  Principal Problem:    Pneumonia due to COVID-19 virus  Active Problems: Morbid obesity with BMI of 40.0-44.9, adult (HCC)    Hypoxia    CRP elevated    Patient is [employer]  Resolved Problems:    * No resolved hospital problems.  *      Admission Condition:  poor     Discharged Condition: fair    Hospital Stay:     Hospital Course:  Amadeo Hammond is a 50 y.o. female who was admitted for the management of   Pneumonia due to COVID-19 virus , presented to ER with Shortness of Breath (tested positive for COVID 9 days ago)    1 COVID-19 pneumonia  Completed emdesivir on 2020    Decadron on  no steroid on discharge  Continue vitamin supplement Patient refused plasma  ID recommendation appreciated       #2 acute hypoxemic respiratory failure secondary to Covid pneumonia oxygen treatment as above resolved        #3 morbid obesity diet and exercise sleep study as outpatient     Body mass index is 41.85 kg/m².     #4 abnormal LFT most likely fatty liver diet and exercise follow-up with PCP    Physical exam     Alert  Chest: Clear to auscultation bilateral  Abdomen: Nontender nondistended  CVS: S1-S2  Neurology: Moves extremity         Significant therapeutic interventions:     Significant Diagnostic Studies:   Labs / Micro:  CBC:   Lab Results   Component Value Date    WBC 5.7 12/24/2020    RBC 4.35 12/24/2020    HGB 12.4 12/24/2020    HCT 39.7 12/24/2020    MCV 91.3 12/24/2020    MCH 28.5 12/24/2020    MCHC 31.2 12/24/2020    RDW 13.5 12/24/2020     12/24/2020     BMP:    Lab Results   Component Value Date    GLUCOSE 94 12/24/2020     12/24/2020    K 3.8 12/24/2020     12/24/2020    CO2 23 12/24/2020    ANIONGAP 11 12/24/2020    BUN 15 12/24/2020    CREATININE 0.73 12/24/2020    BUNCRER NOT REPORTED 12/24/2020    CALCIUM 8.5 12/24/2020    LABGLOM >60 12/24/2020    GFRAA >60 12/24/2020    GFR      12/24/2020    GFR NOT REPORTED 12/24/2020     HFP:    Lab Results   Component Value Date    PROT 7.3 12/23/2020     CMP:    Lab Results   Component Value Date    GLUCOSE 94 12/24/2020     12/24/2020    K 3.8 12/24/2020     12/24/2020    CO2 23 12/24/2020    BUN 15 12/24/2020    CREATININE 0.73 12/24/2020    ANIONGAP 11 12/24/2020    ALKPHOS 64 12/23/2020    ALT 43 12/23/2020    AST 57 12/23/2020    BILITOT 0.38 12/23/2020    LABALBU 3.9 12/23/2020    ALBUMIN 1.1 12/23/2020    LABGLOM >60 12/24/2020    GFRAA >60 12/24/2020    GFR      12/24/2020    GFR NOT REPORTED 12/24/2020    PROT 7.3 12/23/2020    CALCIUM 8.5 12/24/2020     PT/INR:    Lab Results   Component Value Date    PROTIME 10.0 12/27/2020    INR 1.0 12/27/2020     PTT: Lab Results   Component Value Date    APTT 25.6 12/27/2020     FLP:  No results found for: CHOL, TRIG, HDL  U/A:    Lab Results   Component Value Date    COLORU YELLOW 12/23/2020    TURBIDITY CLEAR 12/23/2020    SPECGRAV 1.013 12/23/2020    HGBUR NEGATIVE 12/23/2020    PHUR 5.5 12/23/2020    PROTEINU NEGATIVE 12/23/2020    GLUCOSEU NEGATIVE 12/23/2020    KETUA SMALL 12/23/2020    BILIRUBINUR NEGATIVE 12/23/2020    UROBILINOGEN Normal 12/23/2020    NITRU NEGATIVE 12/23/2020    LEUKOCYTESUR SMALL 12/23/2020     TSH:  No results found for: TSH     Radiology:  Xr Chest Portable    Result Date: 12/23/2020  Mild diffuse interstitial prominence is seen which could be related to atypical/viral pneumonia or pulmonary vascular congestion. Ct Chest Pulmonary Embolism W Contrast    Result Date: 12/23/2020  1. No acute pulmonary thromboembolic disease. Prominent main pulmonary artery suggests pulmonary hypertension. 2.  Multifocal bilateral patchy ground-glass and heterogeneous opacities. Commonly reported imaging features of COVID-19 pneumonia are present. Other processes such as influenza pneumonia and organizing pneumonia, as can be seen with drug toxicity and connective tissue disease, can cause a similar imaging pattern. Consultations:    Consults:     Final Specialist Recommendations/Findings:   IP CONSULT TO HOSPITALIST  PHARMACY TO DOSE MEDICATION  IP CONSULT TO IV TEAM  IP CONSULT TO INFECTIOUS DISEASES      The patient was seen and examined on day of discharge and this discharge summary is in conjunction with any daily progress note from day of discharge. Discharge plan:     Disposition: Home    Physician Follow Up:     No follow-up provider specified.      Requiring Further Evaluation/Follow Up POST HOSPITALIZATION/Incidental Findings:     Diet: cardiac diet    Activity: As tolerated    Instructions to Patient:     Discharge Medications:      Medication List      START taking these medications vitamin D 50 MCG (2000 UT) Tabs tablet  Commonly known as: CHOLECALCIFEROL  Take 1 tablet by mouth daily  Start taking on: December 28, 2020     zinc sulfate 220 (50 Zn) MG capsule  Commonly known as: ZINCATE  Take 1 capsule by mouth 2 times daily        CHANGE how you take these medications    ascorbic acid 500 MG tablet  Commonly known as: VITAMIN C  Take 1 tablet by mouth 2 times daily  What changed:   · medication strength  · how much to take  · when to take this        CONTINUE taking these medications    ferrous sulfate 325 (65 Fe) MG tablet  Commonly known as: IRON 325           Where to Get Your Medications      These medications were sent to [facility] [address] [facility], [address] [facility]  [address], [address] [address]    Phone: [phone]   · ascorbic acid 500 MG tablet  · vitamin D 50 MCG (2000 UT) Tabs tablet     You can get these medications from any pharmacy    You don't need a prescription for these medications  · zinc sulfate 220 (50 Zn) MG capsule         No discharge procedures on file. Time Spent on discharge is  35 mins in patient examination, evaluation, counseling as well as medication reconciliation, prescriptions for required medications, discharge plan and follow up. Electronically signed by   Casa Bryant MD  12/27/2020  12:11 PM      Thank you Dr. Tien Sears primary care provider on file. for the opportunity to be involved in this patient's care.

## 2020-12-28 ENCOUNTER — CARE COORDINATION (OUTPATIENT)
Dept: CASE MANAGEMENT | Age: 55
End: 2020-12-28

## 2020-12-28 NOTE — CARE COORDINATION
Covid-19 Outreach call, no answer.   Left VM with contact information and request  for return call at [address], [address] [place] [address] Coordination Transition

## 2020-12-29 ENCOUNTER — CARE COORDINATION (OUTPATIENT)
Dept: CASE MANAGEMENT | Age: 55
End: 2020-12-29

## 2020-12-29 NOTE — CARE COORDINATION
[facility] Transitions Follow Up Call    2020    Patient: Rita Logan  Patient : 1965   MRN: [id number]  Reason for Admission: COVID 19  Discharge Date: 20 RARS: Readmission Risk Score: 10         Spoke with: Maximino Vines  States that she has periods of exhaustion. Jonnie Speaker came to [place] from [place] for [place] with her  and both were  infected with COVID 19 while here. They will be returning to [place] in a week. They will be scheduling an appointment with the PCP upon returning home. Patient contacted regarding [id number] diagnosis\". Discussed COVID-19 related testing which was available at this time. Test results were positive. Patient informed of results, if available? Yes    Care Transition Nurse/ Ambulatory Care Manager contacted the patient by telephone to perform post discharge assessment. Call within 2 business days of discharge: Yes. Verified name and  with patient as identifiers. Provided introduction to self, and explanation of the CTN/ACM role, and reason for call due to risk factors for infection and/or exposure to COVID-19. Symptoms reviewed with patient who verbalized the following symptoms: fatigue. Due to no new or worsening symptoms encounter was not routed to provider for escalation. Discussed follow-up appointments. If no appointment was previously scheduled, appointment scheduling offered: Lives in [place], will be returning home in a week. [facility] follow up appointment(s): No future appointments.       Non-face-to-face services provided:  Obtained and reviewed discharge summary and/or continuity of care documents  Reviewed and followed up on pending diagnostic tests and treatments-prior to call  Education of patient/family/caregiver/guardian to support self-management-COVID 19  Assessment and support for treatment adherence and medication management-Medications reviewed     Advance Care Planning:   Does patient have an Advance Directive:  Did not review. Patient has following risk factors of: pneumonia. CTN/ACM reviewed discharge instructions, medical action plan and red flags such as increased shortness of breath, increasing fever and signs of decompensation with patient who verbalized understanding. Discussed exposure protocols and quarantine with CDC Guidelines What to do if you are sick with coronavirus disease 2019.  Patient was given an opportunity for questions and concerns. The patient agrees to contact the [facility] exposure line [phone], local health department [facility]: ([phone]) and PCP office for questions related to their healthcare. CTN/ACM provided contact information for future needs. Reviewed and educated patient on any new and changed medications related to discharge diagnosis     Patient/family/caregiver given information for GetWell Loop and agrees to enroll yes  Patient's preferred e-mail: [email or web address]   Patient's preferred phone number: [phone]  Based on Loop alert triggers, patient will be contacted by nurse care manager for worsening symptoms. Pt will be further monitored by COVID Loop Team based on severity of symptoms and risk factors. Follow Up  No future appointments.     Mirtha Ward RN

## 2024-04-18 PROBLEM — J01.00 ACUTE MAXILLARY SINUSITIS: Status: ACTIVE | Noted: 2017-02-12

## 2025-02-23 ENCOUNTER — HOSPITAL ENCOUNTER (EMERGENCY)
Age: 60
Discharge: HOME | End: 2025-02-23
Attending: EMERGENCY MEDICINE
Payer: MEDICARE

## 2025-02-23 VITALS
WEIGHT: 270 LBS | HEIGHT: 65 IN | SYSTOLIC BLOOD PRESSURE: 192 MMHG | RESPIRATION RATE: 16 BRPM | HEART RATE: 96 BPM | BODY MASS INDEX: 44.98 KG/M2 | DIASTOLIC BLOOD PRESSURE: 107 MMHG | OXYGEN SATURATION: 97 % | TEMPERATURE: 97.8 F

## 2025-02-23 DIAGNOSIS — S61.002A AVULSION OF SKIN OF LEFT THUMB, INITIAL ENCOUNTER: Primary | ICD-10-CM

## 2025-02-23 PROCEDURE — 6360000002 HC RX W HCPCS: Performed by: EMERGENCY MEDICINE

## 2025-02-23 PROCEDURE — 99284 EMERGENCY DEPT VISIT MOD MDM: CPT

## 2025-02-23 PROCEDURE — 12001 RPR S/N/AX/GEN/TRNK 2.5CM/<: CPT

## 2025-02-23 PROCEDURE — 6370000000 HC RX 637 (ALT 250 FOR IP): Performed by: EMERGENCY MEDICINE

## 2025-02-23 PROCEDURE — 90471 IMMUNIZATION ADMIN: CPT | Performed by: EMERGENCY MEDICINE

## 2025-02-23 PROCEDURE — 90715 TDAP VACCINE 7 YRS/> IM: CPT | Performed by: EMERGENCY MEDICINE

## 2025-02-23 RX ORDER — THROMB-CAL-CELL-DRESSING,HEMOS 3" X 3"
1 PADS, MEDICATED (EA) TOPICAL PRN
Status: DISCONTINUED | OUTPATIENT
Start: 2025-02-23 | End: 2025-02-23 | Stop reason: ALTCHOICE

## 2025-02-23 RX ADMIN — Medication 1 EACH: at 14:22

## 2025-02-23 RX ADMIN — TETANUS TOXOID, REDUCED DIPHTHERIA TOXOID AND ACELLULAR PERTUSSIS VACCINE, ADSORBED 0.5 ML: 5; 2.5; 8; 8; 2.5 SUSPENSION INTRAMUSCULAR at 14:09

## 2025-02-23 ASSESSMENT — LIFESTYLE VARIABLES
HOW OFTEN DO YOU HAVE A DRINK CONTAINING ALCOHOL: MONTHLY OR LESS
HOW MANY STANDARD DRINKS CONTAINING ALCOHOL DO YOU HAVE ON A TYPICAL DAY: 1 OR 2

## 2025-02-23 ASSESSMENT — PAIN DESCRIPTION - LOCATION: LOCATION: FINGER (COMMENT WHICH ONE)

## 2025-02-23 ASSESSMENT — PAIN - FUNCTIONAL ASSESSMENT: PAIN_FUNCTIONAL_ASSESSMENT: 0-10

## 2025-02-23 ASSESSMENT — PAIN SCALES - GENERAL: PAINLEVEL_OUTOF10: 7

## 2025-02-23 NOTE — ED PROVIDER NOTES
EMERGENCY DEPARTMENT ENCOUNTER    Pt Name: Tamir Rowan  MRN: [id number]  Birthdate 1965  Date of evaluation: 2/23/25  CHIEF COMPLAINT       Chief Complaint   Patient presents with    Finger Laceration     HISTORY OF PRESENT ILLNESS   59-year-old female presents for left thumb laceration.  Patient states that she was cutting potatoes on a mandolin and she was not using the guard and cut her finger.  She states that she cut a piece of her tip of the finger off.  She denies any other injuries denies any numbness tingling or weakness states she is unsure of her last tetanus.    The history is provided by the patient.           REVIEW OF SYSTEMS     Review of Systems   Constitutional:  Negative for fever.   HENT:  Negative for congestion and ear pain.    Eyes:  Negative for pain.   Respiratory:  Negative for shortness of breath.    Cardiovascular:  Negative for chest pain, palpitations and leg swelling.   Gastrointestinal:  Negative for abdominal pain.   Genitourinary:  Negative for dysuria and flank pain.   Musculoskeletal:  Negative for back pain.   Skin:  Positive for wound. Negative for color change.   Neurological:  Negative for numbness and headaches.   Psychiatric/Behavioral:  Negative for confusion.    All other systems reviewed and are negative.    PASTMEDICAL HISTORY   No past medical history on file.  Past Problem List  Patient Active Problem List   Diagnosis Code    Pneumonia due to COVID-19 virus U07.1, J12.82    Morbid obesity with BMI of 40.0-44.9, adult E66.01, Z68.41    Hypoxia R09.02    CRP elevated R79.82    Patient is [employer] [id number]    Acute maxillary sinusitis J01.00     SURGICAL HISTORY     No past surgical history on file.  CURRENT MEDICATIONS       Discharge Medication List as of 2/23/2025  3:50 PM        CONTINUE these medications which have NOT CHANGED    Details   zinc sulfate (ZINCATE) 220 (50 Zn) MG capsule Take 1 capsule by mouth 2 times daily, Disp-30 capsule, R-0OTC

## 2025-02-24 ASSESSMENT — ENCOUNTER SYMPTOMS
BACK PAIN: 0
COLOR CHANGE: 0
EYE PAIN: 0
ABDOMINAL PAIN: 0
SHORTNESS OF BREATH: 0